# Patient Record
Sex: FEMALE | Race: WHITE | Employment: FULL TIME | URBAN - METROPOLITAN AREA
[De-identification: names, ages, dates, MRNs, and addresses within clinical notes are randomized per-mention and may not be internally consistent; named-entity substitution may affect disease eponyms.]

---

## 2022-03-12 ENCOUNTER — HOSPITAL ENCOUNTER (OUTPATIENT)
Dept: MRI IMAGING | Age: 52
Discharge: HOME | End: 2022-03-12
Attending: PHYSICIAN ASSISTANT
Payer: COMMERCIAL

## 2022-03-12 DIAGNOSIS — R20.2 LEFT LEG PARESTHESIAS: ICD-10-CM

## 2022-03-12 DIAGNOSIS — M54.42 CHRONIC BILATERAL LOW BACK PAIN WITH LEFT-SIDED SCIATICA: ICD-10-CM

## 2022-03-12 DIAGNOSIS — G89.29 CHRONIC BILATERAL LOW BACK PAIN WITH LEFT-SIDED SCIATICA: ICD-10-CM

## 2022-03-12 PROCEDURE — 72148 MRI LUMBAR SPINE W/O DYE: CPT

## 2022-03-14 NOTE — PROGRESS NOTES
MRI demonstrates arthritis, disc herniation, and spinal stenosis.  Follow up with orthopedics tomorrow as scheduled

## 2022-03-30 ENCOUNTER — HOSPITAL ENCOUNTER (OUTPATIENT)
Dept: PHYSICAL THERAPY | Age: 52
Discharge: HOME | End: 2022-03-30
Attending: PHYSICIAN ASSISTANT
Payer: COMMERCIAL

## 2022-03-30 DIAGNOSIS — M43.16 SPONDYLOLISTHESIS OF LUMBAR REGION: ICD-10-CM

## 2022-03-30 DIAGNOSIS — M54.16 LUMBAR RADICULOPATHY: ICD-10-CM

## 2022-03-30 DIAGNOSIS — M47.816 FACET ARTHROPATHY, LUMBAR: ICD-10-CM

## 2022-03-30 DIAGNOSIS — M54.42 CHRONIC LOW BACK PAIN WITH LEFT-SIDED SCIATICA, UNSPECIFIED BACK PAIN LATERALITY: ICD-10-CM

## 2022-03-30 DIAGNOSIS — M48.062 LUMBAR STENOSIS WITH NEUROGENIC CLAUDICATION: ICD-10-CM

## 2022-03-30 DIAGNOSIS — M51.36 DDD (DEGENERATIVE DISC DISEASE), LUMBAR: ICD-10-CM

## 2022-03-30 DIAGNOSIS — G89.29 CHRONIC LOW BACK PAIN WITH LEFT-SIDED SCIATICA, UNSPECIFIED BACK PAIN LATERALITY: ICD-10-CM

## 2022-03-30 PROCEDURE — 97161 PT EVAL LOW COMPLEX 20 MIN: CPT

## 2022-03-30 PROCEDURE — 97110 THERAPEUTIC EXERCISES: CPT

## 2022-03-30 NOTE — THERAPY EVALUATION
Laney Cavazos  : 1970  Primary: [employer]tan [employer] [facility] [employer]*  Secondary:  [address] at [place] [address] [address] [facility], [address] [place] [address], [facility], [address] [place] [address]  [phone]   [phone]       OUTPATIENT PHYSICAL THERAPY:Initial Assessment 3/30/2022    ICD-10: Treatment Diagnosis: Chronic low back pain with left sided sciatica, unspecified back pain laterality [M54.4, G89.29]                Treatment Diagnosis 2: DDD, lumbar [M51.36]                 Treatment Diagnosis 3: Lumbar stenosis with neurogenic claudication [M48.062]                 Treatment Diagnosis 4: Spondylolisthesis of lumbar region [M43.16]                Treatment Diagnosis 5: Facet arthropathy, lumbar [M47.816]                 Treatment Diagnosis 6: Lumbar radiculopathy [M54.16]                 Treatment Diagnosis 7: other abnormalities of gait and mobility (R26.89)   Precautions: Hypertension  Allergies: Patient has no known allergies. TREATMENT PLAN:  Effective Dates: 3/30/2022 TO 2022 (60 days). Frequency/Duration: 1-2 times a week for 60 Day(s) MEDICAL/REFERRING DIAGNOSIS:  Chronic low back pain with left-sided sciatica, unspecified back pain laterality [M54.42, G89.29]  DDD (degenerative disc disease), lumbar [M51.36]  Lumbar stenosis with neurogenic claudication [M48.062]  Spondylolisthesis of lumbar region [M43.16]  Facet arthropathy, lumbar [M47.816]  Lumbar radiculopathy [M54.16]   DATE OF ONSET: Chronic LBP with exacerbation over last 6 months   REFERRING PHYSICIAN: EMORY Ravi MD Orders: Evaluate and Treat   Return MD Appointment: 2022      INITIAL ASSESSMENT:  Ms. Connie Sanchez is a 54 y.o. female presenting to physical therapy with complaints of chronic low back pain with an exacerbation beginning about six months ago. She reports back pain ongoing for years with insidious onset.  She reports no significant acute injuries or surgeries to her back but a gradual onset with exacerbations over the years. Imaging revealed \"levoscoliosis but there is also some listing involved as well. Sagittal view of the lumbar spine reveals advanced degenerative changes in the lower lumbar spine with facet arthropathy. There is a grade 1 anterior listhesis and slight kyphosis through the L4-5 segment. 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 She is now working in [employer] where she is seated most of the day as her back pain will not allow her to perform those duties. She reports she enjoys walking for exercise and is eager to return to [facility] or better as she now has to plan her day around how far she will have to walk before she takes a sitting break due to severe pain. She states the more activity she does the worse the pain gets until she can sit down. She also reports sleep is disturbed and she has to be careful how she gets up when transferring supine to sitting. She describes the pain as a constant pain in her low back with radiating symptoms all the way down both LEs; worse on the L than the R with sensation loss. She is eager to reduce pain and return to normal life with as little pain or modification as possible. Patient presents with increased pain, decreased strength, decreased ROM, decreased flexibility, impaired gait, impaired posture, impaired transfer ability, decreased activity tolerance, and overall impaired functional mobility. Patient is a good candidate for skilled physical therapy interventions to include manual therapy, therapeutic exercise, balance training, gait training, transfer training, postural re-education, body mechanics training, and pain modalities as needed. PROBLEM LIST (Impacting functional limitations):  1. Decreased Strength  2. Decreased ADL/Functional Activities  3.  Decreased Transfer Abilities  4. Decreased Ambulation Ability/Technique  5. Decreased Balance  6. Increased Pain  7. Decreased Activity Tolerance  8. Decreased Flexibility/Joint Mobility  9. Decreased [place] with Home Exercise Program INTERVENTIONS PLANNED: (Treatment may consist of any combination of the following)  1. Balance Exercise  2. Bed Mobility  3. Cold  4. Cryotherapy  5. Electrical Stimulation  6. Family Education  7. Gait Training  8. Heat  9. Home Exercise Program (HEP)  10. Manual Therapy  11. Neuromuscular Re-education/Strengthening  12. Range of Motion (ROM)  13. Therapeutic Activites  14. Therapeutic Exercise/Strengthening  15. Transfer Training     GOALS: (Goals have been discussed and agreed upon with patient.)  Short-Term Functional Goals: Time Frame: 3/30/2022 to 04/30/2022  1. Patient demonstrates independence with home exercise program without verbal cueing provided by therapist.   2. Patient will report no more than 5/10 low back pain at rest in order to demonstrate improved self pain control and tolerance. 3. Patient will be educated in and demonstrate posterior pelvic tilt without performing valsalva maneuver. 4. Patient will be educated in and demonstrate proper squat lift technique in order to reduce stress on spine, improve safety, and reduce risk of further injury. Discharge Goals: Time Frame: 3/30/2022 to 05/29/2022  1. Patient will return to walking for exercise at prior level without pain as a limiting factor. 2. Patient will demonstrate bilateral hip strength of 4+/5 or better to facilitate improved gait and reduce back pain. 3. Patient will perform sit to stand and sit to and from supine transfers without pain. 4. Patient will return to regular recreational activities like shopping without having to take sitting breaks due to back pain. 5. Patient will improve Modified Oswestry Scale score to 10/50 from 26/50. Outcome Measure:    Tool Used: Modified Oswestry Low Back Pain Questionnaire  Score:  Initial: 26/50  Most Recent: X/50 (Date: -- )   Interpretation of Score: Each section is scored on a 0-5 scale, 5 representing the greatest disability. The scores of each section are added together for a total score of 50. Medical Necessity:   · Patient is expected to demonstrate progress in strength, range of motion, balance, coordination and functional technique to improve safety during ADLs, prevent risk of further injury, reduce pain. .  · Skilled intervention continues to be required due to need for exercise progressions and modifications as indicated. .  Reason for Services/Other Comments:  · Patient continues to require skilled intervention due to need for plan of care modifications and exceptions based on patient presentation. .  Total Evaluation Duration: 25 minutes    Rehabilitation Potential For Stated Goals: Good  Regarding Laney Cavazos's therapy, I certify that the treatment plan above will be carried out by a therapist or under their direction. Thank you for this referral,  Jovita Langston, PT     Referring Physician Signature: EMORY Fischer _______________________________ Date _____________             PAIN/SUBJECTIVE:    Initial: Pain Intensity 1: 8  Pain Location 1: Back,Leg,Buttocks  Pain Orientation 1: Posterior,Lateral,Left,Right  Post Session:  8/10    HISTORY:    History of Injury/Illness (Reason for Referral):  Ms. Jen Gomez is a 54 y.o. female presenting to physical therapy with complaints of chronic low back pain with an exacerbation beginning about six months ago. She reports back pain ongoing for years with insidious onset. She reports no significant acute injuries or surgeries to her back but a gradual onset with exacerbations over the years. Imaging revealed \"levoscoliosis but there is also some listing involved as well. Sagittal view of the lumbar spine reveals advanced degenerative changes in the lower lumbar spine with facet arthropathy.   There is a grade 1 anterior listhesis and slight kyphosis through the L4-5 segment. 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 She is now working in [employer] where she is seated most of the day as her back pain will not allow her to perform those duties. She reports she enjoys walking for exercise and is eager to return to [employer] or better as she now has to plan her day around how far she will have to walk before she takes a sitting break due to severe pain. She states the more activity she does the worse the pain gets until she can sit down. She also reports sleep is disturbed and she has to be careful how she gets up when transferring supine to sitting. She describes the pain as a constant pain in her low back with radiating symptoms all the way down both LEs; worse on the L than the R with sensation loss. She is eager to reduce pain and return to normal life with as little pain or modification as possible. Patient presents with increased pain, decreased strength, decreased ROM, decreased flexibility, impaired gait, impaired posture, impaired transfer ability, decreased activity tolerance, and overall impaired functional mobility. Patient is a good candidate for skilled physical therapy interventions to include manual therapy, therapeutic exercise, balance training, gait training, transfer training, postural re-education, body mechanics training, and pain modalities as needed. Past Medical History/Comorbidities:   Ms. Lisa Moreira  has a past medical history of GERD (gastroesophageal reflux disease). Ms. Lisa Moreira  has a past surgical history that includes hx gyn; hx cholecystectomy; hx wisdom teeth extraction (Bilateral); and hx gastric bypass. Social History/Living Environment:    Patient lives alone in a home without steps.    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 She previously enjoyed walking as her main way of exercise before this exacerbation. Dominant Side:         RIGHT    Ambulatory/Rehab Services H2 Model Falls Risk Assessment    Risk Factors:       No Risk Factors Identified Ability to Rise from Chair:       (1)  Pushes up, successful in one attempt    Falls Prevention Plan:       No modifications necessary    Total: (5 or greater = High Risk): 1    ©2010 [facility] [employer]. All Rights Reserved. [facility] Patent #[id number]. Federal Law prohibits the replication, distribution or use without written permission from [facility]    Current Medications:        Current Outpatient Medications:     hydroCHLOROthiazide (HYDRODIURIL) 12.5 mg tablet, Take 1 Tablet by mouth daily. , Disp: 90 Tablet, Rfl: 0    nabumetone (RELAFEN) 500 mg tablet, Take 1 Tablet by mouth two (2) times daily as needed for Pain., Disp: 60 Tablet, Rfl: 0    omeprazole (PRILOSEC) 40 mg capsule, Take 40 mg by mouth daily. , Disp: , Rfl:     Date Last Reviewed:  3/30/2022    Number of Personal Factors/Comorbidities that affect the Plan of Care:   1-2: MODERATE COMPLEXITY    EXAMINATION:    Patient denies any LE paresthesia except what is noted. Patient denies any increase of symptoms with cough, sneeze or valsalva. Patient denies any saddle paresthesia or bowel/bladder deficits. Patient does report some increase in symptoms when performing bowel movements and some numbness near saddle area; but no loss of bowel or bladder control. Patient denies any new headaches changes in vision, dizziness, vertigo, nausea, drop attacks, black outs, tinnitus, dysphagia, dysarthria, LE symptoms or bowel/bladder dysfunction. Observation/Orthostatic Postural Assessment:          Leg length near equal in supine and long sitting after bridging. R lateral trunk lean in standing. Palpation:          Patient is non tender to palpation near lumbar paraspinals. ROM:    AROM (degrees)   Lumbar Flexion WNL radiating symptoms worse with repeated motion    Lumbar Extension Impaired; radiating symptoms less with repeated motion    Lumbar Right Sidebend WNL   Lumbar Left Sidebend WNL       Strength: Motion Tested Left   (*/5) Right  (*/5)   Hip Flexion 4- 4   Hip IR 4- 4   Hip ER 4- 4   Knee Flexion 4 4   Knee Extension 5 5   Ankle Dorsiflexion 5 5   Ankle Plantarflexion 4 4   Gross core strength: Impaired based on observation of transfers. Special Tests:          Neural tension tests: Passive straight leg raise (SLR) test is negative. Crossed SLR test is negative. Passive Accessory Motion:         Hip, knee, ankle ROM grossly WNL normal end feel. Neurological Screen:              Myotomes: Key muscle strength testing through bilateral LE is intact at time of examination. Dermatomes: Sensation to light touch for bilateral LE is intact at time of examination. Reflexes: Patellar (L3/ L4): 1+                 Achilles (S1/ S2): 0       Functional Mobility:   Patient ambulates without assistive device with significant R lateral trunk lean. Antalgic gait. Balance:         Patient able to maintain SLS bilaterally for 10 seconds. Impaired based on patient report, slowed gait speed during turning, patient states at times she finds herself seeking UE support on furniture when feeling unstable. Body Structures Involved:  1. Nerves  2. Bones  3. Joints  4. Muscles  5. Ligaments Body Functions Affected:  1. Sensory/Pain  2. Neuromusculoskeletal  3. Movement Related Activities and Participation Affected:  1. General Tasks and Demands  2. Mobility  3. Self Care  4. Domestic Life  5. Interpersonal Interactions and Relationships  6.  Community, Social and [place]    Number of elements (examined above) that affect the Plan of Care: 1-2: LOW COMPLEXITY    CLINICAL PRESENTATION: Presentation:   Stable and uncomplicated: LOW COMPLEXITY    CLINICAL DECISION MAKING:    Use of outcome tool(s) and clinical judgement create a POC that gives a: Clear prediction of patient's progress: LOW COMPLEXITY

## 2022-03-30 NOTE — PROGRESS NOTES
Laney Cavazos  : 1970  Primary: Tae Mayer [facility] [employer]*  Secondary:  [address] at [place] [address] [address] [facility], [address] [place] [address], [address]  [phone]   [phone]      OUTPATIENT PHYSICAL THERAPY: Daily Treatment Note 3/30/2022    ICD-10: Treatment Diagnosis: Chronic low back pain with left sided sciatica, unspecified back pain laterality [M54.4, G89.29]                Treatment Diagnosis 2: DDD, lumbar [M51.36]                 Treatment Diagnosis 3: Lumbar stenosis with neurogenic claudication [M48.062]                 Treatment Diagnosis 4: Spondylolisthesis of lumbar region [M43.16]                Treatment Diagnosis 5: Facet arthropathy, lumbar [M47.816]                 Treatment Diagnosis 6: Lumbar radiculopathy [M54.16]                 Treatment Diagnosis 7: other abnormalities of gait and mobility (R26.89)   Precautions: Hypertension  Allergies: Patient has no known allergies. TREATMENT PLAN:  Effective Dates: 3/30/2022 TO 2022 (60 days). Frequency/Duration: 1-2 times a week for 60 Day(s) MEDICAL/REFERRING DIAGNOSIS:  Chronic low back pain with left-sided sciatica, unspecified back pain laterality [M54.42, G89.29]  DDD (degenerative disc disease), lumbar [M51.36]  Lumbar stenosis with neurogenic claudication [M48.062]  Spondylolisthesis of lumbar region [M43.16]  Facet arthropathy, lumbar [M47.816]  Lumbar radiculopathy [M54.16]   DATE OF ONSET: Chronic LBP with exacerbation over last 6 months   REFERRING PHYSICIAN: EMORY Roach MD Orders: Evaluate and Treat   Return MD Appointment: 2022      Pre-treatment Symptoms/Complaints: Patient stating she is eager to get back to her normal life.     Pain: Initial: Pain Intensity 1: 8  Pain Location 1: Back,Leg,Buttocks  Pain Orientation 1: Posterior,Lateral,Left,Right  Post Session:  810   Medications Last Reviewed:  3/30/2022  Updated Objective Findings:  See evaluation note from today   TREATMENT:   THERAPEUTIC EXERCISE: (25 minutes):  Exercises per grid below to improve mobility, strength and coordination. Required mod visual, verbal, manual and tactile cues to promote proper body alignment, promote proper body posture, promote proper body mechanics and promote proper body breathing techniques. Progressed resistance, range, repetitions and complexity of movement as indicated. Date:  3/30/2022 Date:   Date:     Activity/Exercise Parameters Parameters Parameters   Posterior Pelvic Tilt  1 x 15 with cues hooklying     Clamshell (unilateral)  X 30 hooklying black band around knees unilateral bilaterally with TA      Hip Adduction  X 30 with posterior pelvic tilt 3 second holds      Piriformis Stretch  2 x 30 sec each      Dead Bug  X 20 alternating emphasis on maintaining posterior pelvic tilt and control                    Time spent with patient reviewing proper muscle recruitment and technique with exercises; patient education. MANUAL THERAPY: (0 minutes): Joint mobilization and Soft tissue mobilization was utilized and necessary because of the patient's restricted joint motion, painful spasm, loss of articular motion and restricted motion of soft tissue   None today     MODALITIES: (0 minutes):      None today     HEP: As above; handouts given to patient for all exercises. Treatment/Session Summary:    · Response to Treatment:  Patient demonstrating good understanding of plan of care, exam findings, HEP. · Baseline vitals (3/30/2022): BP: 149/96mmHg 84bpm 98% SpO2   · Communication/Consultation:  Education on exam findings, plan of care, rationale for therapy as a means to address symptoms. · Equipment provided today:  HEP handout; black theraband loop   · Recommendations/Intent for next treatment session: Next visit will focus on exercise progressions focusing on neutral spine strengthening and lumbopelvic control.      Total Treatment Billable Duration:  50 minutes: 25 evaluation, 25 therapeutic exercise   PT Patient Time In/Time Out  Time In: 1106  Time Out: [address] [facility] [place],

## 2022-04-07 ENCOUNTER — HOSPITAL ENCOUNTER (OUTPATIENT)
Dept: PHYSICAL THERAPY | Age: 52
Discharge: HOME | End: 2022-04-07
Attending: PHYSICIAN ASSISTANT
Payer: COMMERCIAL

## 2022-04-07 PROBLEM — E66.9 OBESITY (BMI 30-39.9): Status: ACTIVE | Noted: 2022-04-07

## 2022-04-07 PROBLEM — M48.062 SPINAL STENOSIS OF LUMBAR REGION WITH NEUROGENIC CLAUDICATION: Status: ACTIVE | Noted: 2022-04-07

## 2022-04-07 PROBLEM — M43.16 SPONDYLOLISTHESIS AT L4-L5 LEVEL: Status: ACTIVE | Noted: 2022-04-07

## 2022-04-07 NOTE — PROGRESS NOTES
Physical Therapy  Donovan Montes at [facility] 4/7/2022     Patient was placed on call list but appointment that worked with patient's schedule did not become available.      Danielle Caicedo PT, DPT

## 2022-04-08 ENCOUNTER — HOSPITAL ENCOUNTER (OUTPATIENT)
Dept: PHYSICAL THERAPY | Age: 52
Discharge: HOME | End: 2022-04-08
Attending: PHYSICIAN ASSISTANT
Payer: COMMERCIAL

## 2022-04-08 PROCEDURE — 97110 THERAPEUTIC EXERCISES: CPT

## 2022-04-08 PROCEDURE — 97140 MANUAL THERAPY 1/> REGIONS: CPT

## 2022-04-08 NOTE — PROGRESS NOTES
Laney Cavazos  : 1970  Primary: Luther Mayer [facility] [employer]*  Secondary:  [address] at [place] [id number] [facility], [address] [place] [address], [place], [address] [place] [address]  [phone]   [phone]      OUTPATIENT PHYSICAL THERAPY: Daily Treatment Note 2022    ICD-10: Treatment Diagnosis: Chronic low back pain with left sided sciatica, unspecified back pain laterality [M54.4, G89.29]                Treatment Diagnosis 2: DDD, lumbar [M51.36]                 Treatment Diagnosis 3: Lumbar stenosis with neurogenic claudication [M48.062]                 Treatment Diagnosis 4: Spondylolisthesis of lumbar region [M43.16]                Treatment Diagnosis 5: Facet arthropathy, lumbar [M47.816]                 Treatment Diagnosis 6: Lumbar radiculopathy [M54.16]                 Treatment Diagnosis 7: other abnormalities of gait and mobility (R26.89)   Precautions: Hypertension  Allergies: Patient has no known allergies. TREATMENT PLAN:  Effective Dates: 3/30/2022 TO 2022 (60 days). Frequency/Duration: 1-2 times a week for 60 Day(s) MEDICAL/REFERRING DIAGNOSIS:  Chronic low back pain with left-sided sciatica, unspecified back pain laterality [M54.42, G89.29]  DDD (degenerative disc disease), lumbar [M51.36]  Lumbar stenosis with neurogenic claudication [M48.062]  Spondylolisthesis of lumbar region [M43.16]  Facet arthropathy, lumbar [M47.816]  Lumbar radiculopathy [M54.16]   DATE OF ONSET: Chronic LBP with exacerbation over last 6 months   REFERRING PHYSICIAN: EMORY Rodriguez MD Orders: Evaluate and Treat   Return MD Appointment: 2022      Pre-treatment Symptoms/Complaints: Patient reported standing and walking cause increased pain. Pain: Initial: Pain Intensity 1: 4  Pain Location 1: Back  Pain Orientation 1: Lateral,Left,Posterior  Post Session: 4 /10   Medications Last Reviewed:  2022  Updated Objective Findings:  Pt. entered clinic with moderate antalgic gait. TREATMENT:   THERAPEUTIC EXERCISE: (30 minutes):  Exercises per grid below to improve mobility, strength and coordination. Required mod visual, verbal, manual and tactile cues to promote proper body alignment, promote proper body posture, promote proper body mechanics and promote proper body breathing techniques. Progressed resistance, range, repetitions and complexity of movement as indicated. Date:  3/30/2022 Date:  4/8/22 Date:     Activity/Exercise Parameters Parameters Parameters   Posterior Pelvic Tilt  1 x 15 with cues hooklying X 20 reps in hooklying 5 sec hold each     Clamshell (unilateral)  X 30 hooklying black band around knees unilateral bilaterally with TA  X 30 reps hooklying black band around both knee unilateral with TA    Hip Adduction  X 30 with posterior pelvic tilt 3 second holds  X 30 reps with posterior pelvic tilt 5 sec holds     Piriformis Stretch  2 x 30 sec each  4x30 sec hold each     Dead Bug  X 20 alternating emphasis on maintaining posterior pelvic tilt and control  X 20 reps alternating emphasis on maintaining posterior pelvic tilt and control    Hamstring stretch  4x30 sec hold BLE's with strap             Time spent with patient reviewing proper muscle recruitment and technique with exercises; patient education. MANUAL THERAPY: (25 minutes): Joint mobilization and Soft tissue mobilization was utilized and necessary because of the patient's restricted joint motion, painful spasm, loss of articular motion and restricted motion of soft tissue   In right sidelying patient received soft tissue mobilization to decrease pain and tightness in left low back and gluteals     MODALITIES: (0 minutes):      None today     HEP: As above; handouts given to patient for all exercises.     Treatment/Session Summary:    · Response to Treatment:  Patient demonstrated knowledge and felt confident with exercises  · Baseline vitals (3/30/2022): BP: 149/96mmHg 84bpm 98% SpO2 · Communication/Consultation:  Education on exam findings, plan of care, rationale for therapy as a means to address symptoms. · Equipment provided today:  HEP handout; black theraband loop   · Recommendations/Intent for next treatment session: Next visit will focus on exercise progressions focusing on neutral spine strengthening and lumbopelvic control.      Total Treatment Billable Duration:  55 mins   PT Patient Time In/Time Out  Time In: 1530  Time Out: [address] [place]

## 2022-04-11 PROBLEM — E66.9 OBESITY (BMI 30-39.9): Status: ACTIVE | Noted: 2022-04-07

## 2022-04-11 PROBLEM — M48.062 SPINAL STENOSIS OF LUMBAR REGION WITH NEUROGENIC CLAUDICATION: Status: ACTIVE | Noted: 2022-04-07

## 2022-04-11 PROBLEM — M43.16 SPONDYLOLISTHESIS AT L4-L5 LEVEL: Status: ACTIVE | Noted: 2022-04-07

## 2022-04-13 ENCOUNTER — HOSPITAL ENCOUNTER (OUTPATIENT)
Dept: PHYSICAL THERAPY | Age: 52
Discharge: HOME | End: 2022-04-13
Attending: PHYSICIAN ASSISTANT
Payer: COMMERCIAL

## 2022-04-13 PROCEDURE — 97110 THERAPEUTIC EXERCISES: CPT

## 2022-04-13 PROCEDURE — 97140 MANUAL THERAPY 1/> REGIONS: CPT

## 2022-04-13 NOTE — PROGRESS NOTES
Laney Jadechie  : 1970  Primary: [employer] [facility] [employer]*  Secondary:  [address] at [place] [id number] Shaheen Locke [facility], [address] [place] [address], [place], [address] [place] [address]  [phone]   [phone]      OUTPATIENT PHYSICAL THERAPY: Daily Treatment Note 2022    ICD-10: Treatment Diagnosis: Chronic low back pain with left sided sciatica, unspecified back pain laterality [M54.4, G89.29]                Treatment Diagnosis 2: DDD, lumbar [M51.36]                 Treatment Diagnosis 3: Lumbar stenosis with neurogenic claudication [M48.062]                 Treatment Diagnosis 4: Spondylolisthesis of lumbar region [M43.16]                Treatment Diagnosis 5: Facet arthropathy, lumbar [M47.816]                 Treatment Diagnosis 6: Lumbar radiculopathy [M54.16]                 Treatment Diagnosis 7: other abnormalities of gait and mobility (R26.89)   Precautions: Hypertension  Allergies: Patient has no known allergies. TREATMENT PLAN:  Effective Dates: 3/30/2022 TO 2022 (60 days). Frequency/Duration: 1-2 times a week for 60 Day(s) MEDICAL/REFERRING DIAGNOSIS:  Chronic low back pain with left-sided sciatica, unspecified back pain laterality [M54.42, G89.29]  DDD (degenerative disc disease), lumbar [M51.36]  Lumbar stenosis with neurogenic claudication [M48.062]  Spondylolisthesis of lumbar region [M43.16]  Facet arthropathy, lumbar [M47.816]  Lumbar radiculopathy [M54.16]   DATE OF ONSET: Chronic LBP with exacerbation over last 6 months   REFERRING PHYSICIAN: EMORY Ravi MD Orders: Evaluate and Treat   Return MD Appointment: 2022      Pre-treatment Symptoms/Complaints: Patient reports prolonged standing and walking continue to cause pain, such as going around the grocery store.     Pain: Initial: Pain Intensity 1: 3  Pain Location 1: Back  Pain Orientation 1: Lateral,Left,Posterior  Post Session: 4 /10   Medications Last Reviewed:  2022  Updated Objective Findings: Patient ambulating with flexion moment at trunk   TREATMENT:   THERAPEUTIC EXERCISE: (30 minutes):  Exercises per grid below to improve mobility, strength and coordination. Required mod visual, verbal, manual and tactile cues to promote proper body alignment, promote proper body posture, promote proper body mechanics and promote proper body breathing techniques. Progressed resistance, range, repetitions and complexity of movement as indicated. Date:  3/30/2022 Date:  4/8/22 Date:  4/13/22   Activity/Exercise Parameters Parameters Parameters   Posterior Pelvic Tilt  1 x 15 with cues hooklying X 20 reps in hooklying 5 sec hold each  x10 in hooklying cuing for technique    Clamshell (unilateral)  X 30 hooklying black band around knees unilateral bilaterally with TA  X 30 reps hooklying black band around both knee unilateral with TA 2x10 bilat hooklying with black band around both knee with TA activation   Hip Adduction  X 30 with posterior pelvic tilt 3 second holds  X 30 reps with posterior pelvic tilt 5 sec holds  X 30 reps with posterior pelvic tilt 5 sec holds   Piriformis Stretch  2 x 30 sec each  4x30 sec hold each  2 x 30 sec each    Dead Bug  X 20 alternating emphasis on maintaining posterior pelvic tilt and control  X 20 reps alternating emphasis on maintaining posterior pelvic tilt and control X 20 reps alternating emphasis on maintaining posterior pelvic tilt and control    Cuing for UE only with TA activation   Hamstring stretch  4x30 sec hold BLE's with strap  x20 bilat with strap holding 2-3 sec   Bridge   1x5 with posterior tilt stopped secondary to pain    3x5 straight leg glute set on roller   Leg Raise   2x10 sidelying hip abduction     Time spent with patient reviewing proper muscle recruitment and technique with exercises; patient education.      MANUAL THERAPY: (24 minutes): Joint mobilization and Soft tissue mobilization was utilized and necessary because of the patient's restricted joint motion, painful spasm, loss of articular motion and restricted motion of soft tissue   In right sidelying patient received soft tissue mobilization to decrease pain and tightness in left low back and gluteals     MODALITIES: (0 minutes):      None today     HEP: As above; handouts given to patient for all exercises. Treatment/Session Summary:    · Response to Treatment:  Added sidelying hip abduction and glute set per supine with leg over roller. Patient was a little sore after trying bilat hooklying bridge so exercise was stopped. Patient verablized understanding of HEP. · Baseline vitals (3/30/2022): BP: 149/96mmHg 84bpm 98% SpO2   · Communication/Consultation:  Education on exam findings, plan of care, rationale for therapy as a means to address symptoms. · Equipment provided today:  HEP handout; black theraband loop   · Recommendations/Intent for next treatment session: Next visit will focus on exercise progressions focusing on neutral spine strengthening and lumbopelvic control.      Total Treatment Billable Duration:  54 mins   PT Patient Time In/Time Out  Time In: 0700  Time Out: 0800  Marline Rowan PT

## 2022-04-15 ENCOUNTER — HOSPITAL ENCOUNTER (OUTPATIENT)
Dept: PHYSICAL THERAPY | Age: 52
Discharge: HOME | End: 2022-04-15
Attending: PHYSICIAN ASSISTANT
Payer: COMMERCIAL

## 2022-04-15 PROCEDURE — 97110 THERAPEUTIC EXERCISES: CPT

## 2022-04-15 PROCEDURE — 97140 MANUAL THERAPY 1/> REGIONS: CPT

## 2022-04-20 ENCOUNTER — HOSPITAL ENCOUNTER (OUTPATIENT)
Dept: PHYSICAL THERAPY | Age: 52
Discharge: HOME | End: 2022-04-20
Attending: PHYSICIAN ASSISTANT
Payer: COMMERCIAL

## 2022-04-20 PROCEDURE — 97140 MANUAL THERAPY 1/> REGIONS: CPT

## 2022-04-20 PROCEDURE — 97110 THERAPEUTIC EXERCISES: CPT

## 2022-04-20 NOTE — PROGRESS NOTES
Laney Cavazos  : 1970  Primary: Ann [employer] [facility] [employer]*  Secondary:  [address] at [place] [id number] Shaheen Locke [facility], [address] [place] [address], [place], [address] [place] [address]  [phone]   [phone]      OUTPATIENT PHYSICAL THERAPY: Daily Treatment Note 2022    ICD-10: Treatment Diagnosis: Chronic low back pain with left sided sciatica, unspecified back pain laterality [M54.4, G89.29]                Treatment Diagnosis 2: DDD, lumbar [M51.36]                 Treatment Diagnosis 3: Lumbar stenosis with neurogenic claudication [M48.062]                 Treatment Diagnosis 4: Spondylolisthesis of lumbar region [M43.16]                Treatment Diagnosis 5: Facet arthropathy, lumbar [M47.816]                 Treatment Diagnosis 6: Lumbar radiculopathy [M54.16]                 Treatment Diagnosis 7: other abnormalities of gait and mobility (R26.89)   Precautions: Hypertension  Allergies: Patient has no known allergies. TREATMENT PLAN:  Effective Dates: 3/30/2022 TO 2022 (60 days). Frequency/Duration: 1-2 times a week for 60 Day(s) MEDICAL/REFERRING DIAGNOSIS:  Chronic low back pain with left-sided sciatica, unspecified back pain laterality [M54.42, G89.29]  DDD (degenerative disc disease), lumbar [M51.36]  Lumbar stenosis with neurogenic claudication [M48.062]  Spondylolisthesis of lumbar region [M43.16]  Facet arthropathy, lumbar [M47.816]  Lumbar radiculopathy [M54.16]   DATE OF ONSET: Chronic LBP with exacerbation over last 6 months   REFERRING PHYSICIAN: EMORY Laguerre MD Orders: Evaluate and Treat   Return MD Appointment: 2022      Pre-treatment Symptoms/Complaints: Patient reports continued discomfort over past few days with little to no relief. Patient had to  line for 30 minutes and reported increased symptoms. Patient reports epidural may have worn off and she is getting increased discomfort from all exercises on her L side.      Pain: Initial: Pain Intensity 1: 5  Pain Location 1: Back  Pain Orientation 1: Lateral,Left,Posterior  Post Session: 5/10   Medications Last Reviewed:  4/20/2022  Updated Objective Findings:  Patient reports increased pain referral down L leg today with exercise. TREATMENT:   THERAPEUTIC EXERCISE: (30 minutes):  Exercises per grid below to improve mobility, strength and coordination. Required mod visual, verbal, manual and tactile cues to promote proper body alignment, promote proper body posture, promote proper body mechanics and promote proper body breathing techniques. Progressed resistance, range, repetitions and complexity of movement as indicated.      Date:  04/15/22 Date:  4/20/22 Date:  4/13/22   Activity/Exercise Parameters Parameters Parameters   Posterior Pelvic Tilt  1 x 15 with cues hooklying 1 x 15 with cues hooklying x10 in hooklying cuing for technique    Clamshell (unilateral)  2 x 10 hooklying black band around knees unilateral bilaterally with TA  2 x 10 hooklying black band around knees unilateral bilaterally with TA  2x10 bilat hooklying with black band around both knee with TA activation   Hip Adduction  X 20 with posterior pelvic tilt and TA 5 sec holds X 20 with posterior pelvic tilt and TA 5 sec holds  X 30 reps with posterior pelvic tilt 5 sec holds   Piriformis Stretch  HEP HEP  2 x 30 sec each    Dead Bug  Attempted to modify but patient reporting increase in pain so HOLD Hold X 20 reps alternating emphasis on maintaining posterior pelvic tilt and control    Cuing for UE only with TA activation   Hamstring stretch HEP HEP x20 bilat with strap holding 2-3 sec   Bridge --- HOLD Hold 1x5 with posterior tilt stopped secondary to pain    3x5 straight leg glute set on roller   Hip Abduction  2 x 10 sidelying cone 2 x 10 sidelying cone 2x10 sidelying hip abduction   Clamshell  2 x 10 sidelying 2 x 10 sidelying    [employer] [employer] Up --- HOLD due to patient report of pain  Hold    Palloff Press  2 x 10 blue band seated  2 x 10 blue band seated      Time spent with patient reviewing proper muscle recruitment and technique with exercises; patient education. MANUAL THERAPY: (26 minutes): Joint mobilization and Soft tissue mobilization was utilized and necessary because of the patient's restricted joint motion, painful spasm, loss of articular motion and restricted motion of soft tissue   In right sidelying patient received soft tissue mobilization to decrease pain and tightness in left low back and gluteals     MODALITIES: (0 minutes):      None today     HEP: As above; handouts given to patient for all exercises. Treatment/Session Summary:    · Response to Treatment:  Patient continues to have intermittent discomfort down L posterior LE with exercise. Cued to perform and control ROM to stay within pain free range. · Baseline vitals (3/30/2022): BP: 149/96mmHg 84bpm 98% SpO2   · Communication/Consultation:  Education on exercise progressions. · Equipment provided today:  HEP handout new   · Recommendations/Intent for next treatment session: Next visit will focus on exercise progressions focusing on neutral spine strengthening and lumbopelvic control.      Total Treatment Billable Duration:  56 mins   PT Patient Time In/Time Out  Time In: 0702  Time Out: 0800  Belen Buerger, PT

## 2022-04-22 ENCOUNTER — HOSPITAL ENCOUNTER (OUTPATIENT)
Dept: PHYSICAL THERAPY | Age: 52
Discharge: HOME | End: 2022-04-22
Attending: PHYSICIAN ASSISTANT
Payer: COMMERCIAL

## 2022-04-22 PROCEDURE — 97110 THERAPEUTIC EXERCISES: CPT

## 2022-04-22 PROCEDURE — 97140 MANUAL THERAPY 1/> REGIONS: CPT

## 2022-04-22 NOTE — PROGRESS NOTES
Laney Cavazos  : 1970  Primary: Philippe Mayer [facility] [employer]*  Secondary:  [address] at [place] [id number] [address] Shaheen Locke [facility], [address] [place] [address], [address]  [phone]   [phone]      OUTPATIENT PHYSICAL THERAPY: Daily Treatment Note 2022    ICD-10: Treatment Diagnosis: Chronic low back pain with left sided sciatica, unspecified back pain laterality [M54.4, G89.29]                Treatment Diagnosis 2: DDD, lumbar [M51.36]                 Treatment Diagnosis 3: Lumbar stenosis with neurogenic claudication [M48.062]                 Treatment Diagnosis 4: Spondylolisthesis of lumbar region [M43.16]                Treatment Diagnosis 5: Facet arthropathy, lumbar [M47.816]                 Treatment Diagnosis 6: Lumbar radiculopathy [M54.16]                 Treatment Diagnosis 7: other abnormalities of gait and mobility (R26.89)   Precautions: Hypertension  Allergies: Patient has no known allergies. TREATMENT PLAN:  Effective Dates: 3/30/2022 TO 2022 (60 days). Frequency/Duration: 1-2 times a week for 60 Day(s) MEDICAL/REFERRING DIAGNOSIS:  Chronic low back pain with left-sided sciatica, unspecified back pain laterality [M54.42, G89.29]  DDD (degenerative disc disease), lumbar [M51.36]  Lumbar stenosis with neurogenic claudication [M48.062]  Spondylolisthesis of lumbar region [M43.16]  Facet arthropathy, lumbar [M47.816]  Lumbar radiculopathy [M54.16]   DATE OF ONSET: Chronic LBP with exacerbation over last 6 months   REFERRING PHYSICIAN: EMORY Fischer MD Orders: Evaluate and Treat   Return MD Appointment: 2022      Pre-treatment Symptoms/Complaints: Patient reports continued discomfort in her L lower extremity and spine; stating it has been this way since last week when she thinks injection may have started wearing off.      Pain: Initial: Pain Intensity 1: 6  Pain Location 1: Back  Pain Orientation 1: Lateral,Left,Posterior  Post Session: 6/10 Medications Last Reviewed:  4/22/2022  Updated Objective Findings: Improved with technique and control during exercises. TREATMENT:   THERAPEUTIC EXERCISE: (23 minutes):  Exercises per grid below to improve mobility, strength and coordination. Required mod visual, verbal, manual and tactile cues to promote proper body alignment, promote proper body posture, promote proper body mechanics and promote proper body breathing techniques. Progressed resistance, range, repetitions and complexity of movement as indicated. Date:  04/22/22 Date:  4/20/22 Date:  4/13/22   Activity/Exercise Parameters Parameters Parameters   Posterior Pelvic Tilt  1 x 15 with cues hooklying 1 x 15 with cues hooklying x10 in hooklying cuing for technique    Clamshell (unilateral)  2 x 10 hooklying black band around knees unilateral bilaterally with TA  2 x 10 hooklying black band around knees unilateral bilaterally with TA  2x10 bilat hooklying with black band around both knee with TA activation   Hip Adduction  X 20 with pillow TA 5 sec holds X 20 with posterior pelvic tilt and TA 5 sec holds  X 30 reps with posterior pelvic tilt 5 sec holds   Piriformis Stretch  2 x 30 sec HEP  2 x 30 sec each    Dead Bug  --- HOLD Hold X 20 reps alternating emphasis on maintaining posterior pelvic tilt and control    Cuing for UE only with TA activation   Hamstring stretch ---  HEP x20 bilat with strap holding 2-3 sec   Bridge --- HOLD Hold 1x5 with posterior tilt stopped secondary to pain    3x5 straight leg glute set on roller   Hip Abduction  --- HOLD 2 x 10 sidelying cone 2x10 sidelying hip abduction   Clamshell  2 x 10 sidelying 2 x 10 sidelying    [employer] [employer] Up --- HOLD  Hold    [employer] Press  2 x 10 blue band seated progressed to one leg extended  2 x 10 blue band seated    SKTC 4 x 30 sec holds       Time spent with patient reviewing proper muscle recruitment and technique with exercises; patient education.      MANUAL THERAPY: (30 minutes): Joint mobilization and Soft tissue mobilization was utilized and necessary because of the patient's restricted joint motion, painful spasm, loss of articular motion and restricted motion of soft tissue   In right and left sidelying patient received soft tissue mobilization to decrease pain and tightness in bilateral low back and gluteals     MODALITIES: (0 minutes):      None today     HEP: As above; handouts given to patient for all exercises. Treatment/Session Summary:    · Response to Treatment:  Tolerated modified session well; avoiding exercises that exacerbated pain . · Baseline vitals (3/30/2022): BP: 149/96mmHg 84bpm 98% SpO2   · Communication/Consultation:  Education on exercise progressions. · Equipment provided today:  None today  · Recommendations/Intent for next treatment session: Next visit will focus on exercise progressions focusing on neutral spine strengthening and lumbopelvic control.      Total Treatment Billable Duration:  53 mins   PT Patient Time In/Time Out  Time In: 1332  Time Out: [address] Frank Martínez, PT

## 2022-04-26 PROBLEM — I10 ESSENTIAL HYPERTENSION: Status: ACTIVE | Noted: 2022-04-26

## 2022-04-27 ENCOUNTER — HOSPITAL ENCOUNTER (OUTPATIENT)
Dept: PHYSICAL THERAPY | Age: 52
Discharge: HOME | End: 2022-04-27
Attending: PHYSICIAN ASSISTANT
Payer: COMMERCIAL

## 2022-04-27 PROCEDURE — 97110 THERAPEUTIC EXERCISES: CPT

## 2022-04-27 PROCEDURE — 97140 MANUAL THERAPY 1/> REGIONS: CPT

## 2022-04-27 NOTE — PROGRESS NOTES
Laney Cavazos  : 1970  Primary: Cheko [employer] [facility] [facility] [employer]*  Secondary:  [address] at [place] [id number] [address] Shaheen Locke [facility], [address] [place] [address], [address]  [phone]   [phone]      OUTPATIENT PHYSICAL THERAPY: Daily Treatment Note 2022    ICD-10: Treatment Diagnosis: Chronic low back pain with left sided sciatica, unspecified back pain laterality [M54.4, G89.29]                Treatment Diagnosis 2: DDD, lumbar [M51.36]                 Treatment Diagnosis 3: Lumbar stenosis with neurogenic claudication [M48.062]                 Treatment Diagnosis 4: Spondylolisthesis of lumbar region [M43.16]                Treatment Diagnosis 5: Facet arthropathy, lumbar [M47.816]                 Treatment Diagnosis 6: Lumbar radiculopathy [M54.16]                 Treatment Diagnosis 7: other abnormalities of gait and mobility (R26.89)   Precautions: Hypertension  Allergies: Patient has no known allergies. TREATMENT PLAN:  Effective Dates: 3/30/2022 TO 2022 (60 days). Frequency/Duration: 1-2 times a week for 60 Day(s) MEDICAL/REFERRING DIAGNOSIS:  Chronic low back pain with left-sided sciatica, unspecified back pain laterality [M54.42, G89.29]  DDD (degenerative disc disease), lumbar [M51.36]  Lumbar stenosis with neurogenic claudication [M48.062]  Spondylolisthesis of lumbar region [M43.16]  Facet arthropathy, lumbar [M47.816]  Lumbar radiculopathy [M54.16]   DATE OF ONSET: Chronic LBP with exacerbation over last 6 months   REFERRING PHYSICIAN: EMORY Omalley MD Orders: Evaluate and Treat   Return MD Appointment: 2022      Pre-treatment Symptoms/Complaints: Patient reports continued discomfort. Patient is feeling a little discouraged and follow up with MD on 22.     Pain: Initial: Pain Intensity 1: 5  Pain Location 1: Back  Pain Orientation 1: Lateral,Left,Posterior  Post Session: 10   Medications Last Reviewed:  2022  Updated Objective Findings: Patient ambulates with decreased hip extension on L side   TREATMENT:   THERAPEUTIC EXERCISE: (25 minutes):  Exercises per grid below to improve mobility, strength and coordination. Required mod visual, verbal, manual and tactile cues to promote proper body alignment, promote proper body posture, promote proper body mechanics and promote proper body breathing techniques. Progressed resistance, range, repetitions and complexity of movement as indicated. Date:  04/22/22 Date:  4/20/22 Date:  4/27/22   Activity/Exercise Parameters Parameters Parameters   Nu Step   5 min level 2.0   Posterior Pelvic Tilt  1 x 15 with cues hooklying 1 x 15 with cues hooklying 1 x 15 with cues hooklying   Clamshell (unilateral)  2 x 10 hooklying black band around knees unilateral bilaterally with TA  2 x 10 hooklying black band around knees unilateral bilaterally with TA  2 x 10 hooklying black band around knees unilateral bilaterally with TA    Hip Adduction  X 20 with pillow TA 5 sec holds X 20 with posterior pelvic tilt and TA 5 sec holds  X 20 with pillow TA 5 sec holds   Piriformis Stretch  2 x 30 sec HEP  2 x 30 sec each    Hamstring stretch ---  HEP HEP   Hip Abduction  --- HOLD 2 x 10 sidelying cone 2x10 sidelying hip abduction   Clamshell  2 x 10 sidelying 2 x 10 sidelying 2 x 10 sidelying   [employer] Press  2 x 10 blue band seated progressed to one leg extended  2 x 10 blue band seated 2 x 10 blue band seated progressed to one leg extended    Half kneeling pelvic tilt on airrex   1x Stopped secondary to pain    SKTC 4 x 30 sec holds  Next session     Time spent with patient reviewing proper muscle recruitment and technique with exercises; patient education.      MANUAL THERAPY: (30 minutes): Joint mobilization and Soft tissue mobilization was utilized and necessary because of the patient's restricted joint motion, painful spasm, loss of articular motion and restricted motion of soft tissue   In right and left sidelying patient received soft tissue mobilization to decrease pain and tightness in bilateral low back and gluteals     MODALITIES: (0 minutes):      None today     HEP: As above; handouts given to patient for all exercises. Treatment/Session Summary:    · Response to Treatment:  Attempted to work on pelvic tilt in half kneeling with UE support but patient had increased pain in L hip that persisted throughout session. Consider attempting PROM hip extension and staggered stance stability exercise next session. Reassess irritability. Patient did get a little relief from nu step. · Baseline vitals (3/30/2022): BP: 149/96mmHg 84bpm 98% SpO2   · Communication/Consultation:  Education on exercise progressions. · Equipment provided today:  None today  · Recommendations/Intent for next treatment session: Next visit will focus on exercise progressions focusing on neutral spine strengthening and lumbopelvic control.      Total Treatment Billable Duration:  55 mins   PT Patient Time In/Time Out  Time In: 0700  Time Out: 0800  Lula Desai, PT

## 2022-04-29 ENCOUNTER — HOSPITAL ENCOUNTER (OUTPATIENT)
Dept: PHYSICAL THERAPY | Age: 52
Discharge: HOME | End: 2022-04-29
Attending: PHYSICIAN ASSISTANT
Payer: COMMERCIAL

## 2022-04-29 PROCEDURE — 97140 MANUAL THERAPY 1/> REGIONS: CPT

## 2022-04-29 PROCEDURE — 97110 THERAPEUTIC EXERCISES: CPT

## 2022-04-29 NOTE — PROGRESS NOTES
Laney Cavazos  : 1970  Primary: [employer] [facility] [employer]*  Secondary:  [address] at [place] [address] [address] Sha[address] [facility], [address] [place] [address], [address]  [phone]   [phone]       OUTPATIENT PHYSICAL THERAPY:Progress Report 2022    ICD-10: Treatment Diagnosis: Chronic low back pain with left sided sciatica, unspecified back pain laterality [M54.4, G89.29]                Treatment Diagnosis 2: DDD, lumbar [M51.36]                 Treatment Diagnosis 3: Lumbar stenosis with neurogenic claudication [M48.062]                 Treatment Diagnosis 4: Spondylolisthesis of lumbar region [M43.16]                Treatment Diagnosis 5: Facet arthropathy, lumbar [M47.816]                 Treatment Diagnosis 6: Lumbar radiculopathy [M54.16]                 Treatment Diagnosis 7: other abnormalities of gait and mobility (R26.89)   Precautions: Hypertension  Allergies: Patient has no known allergies. TREATMENT PLAN:  Effective Dates: 3/30/2022 TO 2022 (60 days). Frequency/Duration: 1-2 times a week for 60 Day(s) MEDICAL/REFERRING DIAGNOSIS:  Lumbago with sciatica, left side [M54.42]  Other intervertebral disc degeneration, lumbar region [M51.36]  Spondylolisthesis, lumbar region [M43.16]  Spondylosis without myelopathy or radiculopathy, lumbar region [M47.816]  Radiculopathy, lumbar region [M54.16]   DATE OF ONSET: Chronic LBP with exacerbation over last 6 months   REFERRING PHYSICIAN: EMORY Regalado MD Orders: Evaluate and Treat   Return MD Appointment: 2022      INITIAL ASSESSMENT:  Ms. Cameron Ceja is a 54 y.o. female presenting to physical therapy with complaints of chronic low back pain with an exacerbation beginning about six months ago. She reports back pain ongoing for years with insidious onset. She reports no significant acute injuries or surgeries to her back but a gradual onset with exacerbations over the years.  Imaging revealed \"levoscoliosis but there is also some listing involved as well. Sagittal view of the lumbar spine reveals advanced degenerative changes in the lower lumbar spine with facet arthropathy. There is a grade 1 anterior listhesis and slight kyphosis through the L4-5 segment. 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 She is now working in [employer] where she is seated most of the day as her back pain will not allow her to perform those duties. She reports she enjoys walking for exercise and is eager to return to PLOF or better as she now has to plan her day around how far she will have to walk before she takes a sitting break due to severe pain. She states the more activity she does the worse the pain gets until she can sit down. She also reports sleep is disturbed and she has to be careful how she gets up when transferring supine to sitting. She describes the pain as a constant pain in her low back with radiating symptoms all the way down both LEs; worse on the L than the R with sensation loss. She is eager to reduce pain and return to normal life with as little pain or modification as possible. Patient presents with increased pain, decreased strength, decreased ROM, decreased flexibility, impaired gait, impaired posture, impaired transfer ability, decreased activity tolerance, and overall impaired functional mobility. Patient is a good candidate for skilled physical therapy interventions to include manual therapy, therapeutic exercise, balance training, gait training, transfer training, postural re-education, body mechanics training, and pain modalities as needed. PROGRESS REPORT 04/29/22: Ms. Tamra Iverson has been seen for 7 sessions of PT with limited success.  She has not experienced any improvement in pain and is planning to proceed with surgery at this point after meeting with surgeon and discussing options. She does demonstrate improved lumbopelvic control with pelvic tilt and would benefit from a few more sessions to practice home exercise program for hip and core strengthening with neutral spine to obtain as much core strength as possible prior to surgery. PROBLEM LIST (Impacting functional limitations):  1. Decreased Strength  2. Decreased ADL/Functional Activities  3. Decreased Transfer Abilities  4. Decreased Ambulation Ability/Technique  5. Decreased Balance  6. Increased Pain  7. Decreased Activity Tolerance  8. Decreased Flexibility/Joint Mobility  9. Decreased Miller with Home Exercise Program INTERVENTIONS PLANNED: (Treatment may consist of any combination of the following)  1. Balance Exercise  2. Bed Mobility  3. Cold  4. Cryotherapy  5. Electrical Stimulation  6. Family Education  7. Gait Training  8. Heat  9. Home Exercise Program (HEP)  10. Manual Therapy  11. Neuromuscular Re-education/Strengthening  12. Range of Motion (ROM)  13. Therapeutic Activites  14. Therapeutic Exercise/Strengthening  15. Transfer Training     GOALS: (Goals have been discussed and agreed upon with patient.)  Short-Term Functional Goals: Time Frame: 3/30/2022 to 04/30/2022  1. Patient demonstrates independence with home exercise program without verbal cueing provided by therapist. (GOAL MET)   2. Patient will report no more than 5/10 low back pain at rest in order to demonstrate improved self pain control and tolerance. (ONGOING)   3. Patient will be educated in and demonstrate posterior pelvic tilt without performing valsalva maneuver. (GOAL MET)  4. Patient will be educated in and demonstrate proper squat lift technique in order to reduce stress on spine, improve safety, and reduce risk of further injury. (ONGOING)  Discharge Goals: Time Frame: 3/30/2022 to 05/29/2022  1. Patient will return to walking for exercise at prior level without pain as a limiting factor. (ONGOING)  2. Patient will demonstrate bilateral hip strength of 4+/5 or better to facilitate improved gait and reduce back pain. (ONGOING)  3. Patient will perform sit to stand and sit to and from supine transfers without pain. (ONGOING)  4. Patient will return to regular recreational activities like shopping without having to take sitting breaks due to back pain. (ONGOING)  5. Patient will improve Modified Oswestry Scale score to 10/50 from 26/50. (ONGOING)    Outcome Measure: Tool Used: Modified Oswestry Low Back Pain Questionnaire  Score:  Initial: 26/50  Most Recent: /50 (Date: --- )   Interpretation of Score: Each section is scored on a 0-5 scale, 5 representing the greatest disability. The scores of each section are added together for a total score of 50. Patient denies any LE paresthesia except what is noted. Patient denies any increase of symptoms with cough, sneeze or valsalva. Patient denies any saddle paresthesia or bowel/bladder deficits. Patient does report some increase in symptoms when performing bowel movements and some numbness near saddle area; but no loss of bowel or bladder control. Patient denies any new headaches changes in vision, dizziness, vertigo, nausea, drop attacks, black outs, tinnitus, dysphagia, dysarthria, LE symptoms or bowel/bladder dysfunction. Observation/Orthostatic Postural Assessment:          Leg length near equal in supine and long sitting after bridging. R lateral trunk lean in standing. Palpation:          Patient is non tender to palpation near lumbar paraspinals. ROM:    AROM (degrees)   Lumbar Flexion WNL radiating symptoms worse with repeated motion    Lumbar Extension Impaired; radiating symptoms less with repeated motion    Lumbar Right Sidebend WNL   Lumbar Left Sidebend WNL       Strength:     Motion Tested Left   (*/5) Right  (*/5)   Hip Flexion 4- 4   Hip IR 4- 4   Hip ER 4- 4   Knee Flexion 4 4   Knee Extension 5 5   Ankle Dorsiflexion 5 5 Ankle Plantarflexion 4 4   Gross core strength: Impaired based on observation of transfers. Special Tests:          Neural tension tests: Passive straight leg raise (SLR) test is negative. Crossed SLR test is negative. Passive Accessory Motion:         Hip, knee, ankle ROM grossly WNL normal end feel. Neurological Screen:              Myotomes: Key muscle strength testing through bilateral LE is intact at time of examination. Dermatomes: Sensation to light touch for bilateral LE is intact at time of examination. Reflexes: Patellar (L3/ L4): 1+                 Achilles (S1/ S2): 0       Functional Mobility:   Patient ambulates without assistive device with significant R lateral trunk lean. Antalgic gait. Balance:         Patient able to maintain SLS bilaterally for 10 seconds. Impaired based on patient report, slowed gait speed during turning, patient states at times she finds herself seeking UE support on furniture when feeling unstable. Medical Necessity:   · Patient is expected to demonstrate progress in strength, range of motion, balance, coordination and functional technique to improve safety during ADLs, prevent risk of further injury, reduce pain. .  · Skilled intervention continues to be required due to need for exercise progressions and modifications as indicated. .  Reason for Services/Other Comments:  · Patient continues to require skilled intervention due to need for plan of care modifications and exceptions based on patient presentation. .    Rehabilitation Potential For Stated Goals: Good  Regarding Laney Cavazos's therapy, I certify that the treatment plan above will be carried out by a therapist or under their direction. Thank you for this referral,  Martina Khanna PT     Referring Physician Signature: EMORY Dumont No Signature is Required for this note.

## 2022-04-29 NOTE — PROGRESS NOTES
Laney Cavazos  : 1970  Primary: Kathleen Mathis [facility] [employer]*  Secondary:  [address] at [place] [id number] [address] Locke [facility], [address] [place] [address], [place], [address] [place] [address]  [phone]   [phone]      OUTPATIENT PHYSICAL THERAPY: Daily Treatment Note 2022    ICD-10: Treatment Diagnosis: Chronic low back pain with left sided sciatica, unspecified back pain laterality [M54.4, G89.29]                Treatment Diagnosis 2: DDD, lumbar [M51.36]                 Treatment Diagnosis 3: Lumbar stenosis with neurogenic claudication [M48.062]                 Treatment Diagnosis 4: Spondylolisthesis of lumbar region [M43.16]                Treatment Diagnosis 5: Facet arthropathy, lumbar [M47.816]                 Treatment Diagnosis 6: Lumbar radiculopathy [M54.16]                 Treatment Diagnosis 7: other abnormalities of gait and mobility (R26.89)   Precautions: Hypertension  Allergies: Patient has no known allergies. TREATMENT PLAN:  Effective Dates: 3/30/2022 TO 2022 (60 days). Frequency/Duration: 1-2 times a week for 60 Day(s) MEDICAL/REFERRING DIAGNOSIS:  Chronic low back pain with left-sided sciatica, unspecified back pain laterality [M54.42, G89.29]  DDD (degenerative disc disease), lumbar [M51.36]  Lumbar stenosis with neurogenic claudication [M48.062]  Spondylolisthesis of lumbar region [M43.16]  Facet arthropathy, lumbar [M47.816]  Lumbar radiculopathy [M54.16]   DATE OF ONSET: Chronic LBP with exacerbation over last 6 months   REFERRING PHYSICIAN: EMORY Ramirez MD Orders: Evaluate and Treat   Return MD Appointment: 2022      Pre-treatment Symptoms/Complaints: Patient reports feeling about the same overall but more symptoms in R leg recently. Stating she is planning to have surgery at this point.      Pain: Initial: Pain Intensity 1: 5  Pain Location 1: Back  Pain Orientation 1: Lateral,Left,Posterior/10  Post Session: 5/10   Medications Last Reviewed: 4/29/2022  Updated Objective Findings: Patient ambulates with decreased hip extension on L side. R leg was feeling numb    TREATMENT:   THERAPEUTIC EXERCISE: (23 minutes):  Exercises per grid below to improve mobility, strength and coordination. Required mod visual, verbal, manual and tactile cues to promote proper body alignment, promote proper body posture, promote proper body mechanics and promote proper body breathing techniques. Progressed resistance, range, repetitions and complexity of movement as indicated. Date:  04/22/22 Date:  4/29/22 Date:  4/27/22   Activity/Exercise Parameters Parameters Parameters   Nu Step --- 10 minutes level 4  5 min level 2.0   Posterior Pelvic Tilt  1 x 15 with cues hooklying HEP 1 x 15 with cues hooklying   Clamshell (unilateral)  2 x 10 hooklying black band around knees unilateral bilaterally with TA  HEP  2 x 10 hooklying black band around knees unilateral bilaterally with TA    Hip Adduction  X 20 with pillow TA 5 sec holds HEP X 20 with pillow TA 5 sec holds   Piriformis Stretch  2 x 30 sec 2 x 30 sec  2 x 30 sec each    Hamstring stretch ---  --- HEP   Hip Abduction  --- HOLD --- 2x10 sidelying hip abduction   Clamshell  2 x 10 sidelying 2 x 10 sidelying 2 x 10 sidelying   Palloff Press  2 x 10 blue band seated progressed to one leg extended  1 x 15 blue band seated  2 x 10 blue band seated progressed to one leg extended    Half kneeling pelvic tilt on airrex  --- HOLD 1x Stopped secondary to pain    SKTC 4 x 30 sec holds 3 x 30 sec  Next session   Latt Pulldown  3 x 10 blue alternating seated    Row  3 x 10 black alternating seated      Time spent with patient reviewing proper muscle recruitment and technique with exercises; patient education.      MANUAL THERAPY: (30 minutes): Joint mobilization and Soft tissue mobilization was utilized and necessary because of the patient's restricted joint motion, painful spasm, loss of articular motion and restricted motion of soft tissue   In right and left sidelying patient received soft tissue mobilization to decrease pain and tightness in bilateral low back and gluteals     MODALITIES: (0 minutes):      None today     HEP: As above; handouts given to patient for all exercises. Treatment/Session Summary:    · Response to Treatment:  Tolerated modified session fairly well with exercises in unsupported sitting. · Baseline vitals (3/30/2022): BP: 149/96mmHg 84bpm 98% SpO2   · Communication/Consultation:  Education on exercise progressions. · Equipment provided today:  None today  · Recommendations/Intent for next treatment session: Next visit will focus on exercise progressions focusing on neutral spine strengthening and lumbopelvic control.      Total Treatment Billable Duration:  53 mins   PT Patient Time In/Time Out  Time In: 1334  Time Out: [address], PT

## 2022-05-04 ENCOUNTER — HOSPITAL ENCOUNTER (OUTPATIENT)
Dept: PHYSICAL THERAPY | Age: 52
Discharge: HOME | End: 2022-05-04
Attending: PHYSICIAN ASSISTANT
Payer: COMMERCIAL

## 2022-05-06 ENCOUNTER — HOSPITAL ENCOUNTER (OUTPATIENT)
Dept: PHYSICAL THERAPY | Age: 52
Discharge: HOME | End: 2022-05-06
Attending: PHYSICIAN ASSISTANT
Payer: COMMERCIAL

## 2022-05-06 PROCEDURE — 97110 THERAPEUTIC EXERCISES: CPT

## 2022-05-06 NOTE — PROGRESS NOTES
Laney Cavazos  : 1970  Primary: Cecil Mayer [facility] [employer]*  Secondary:  [address] at [place] [address] [address] Sha[address]en Locke [facility], [address] [place] [address], [address]  [phone]   [phone]      OUTPATIENT PHYSICAL THERAPY: Daily Treatment Note 2022    ICD-10: Treatment Diagnosis: Chronic low back pain with left sided sciatica, unspecified back pain laterality [M54.4, G89.29]                Treatment Diagnosis 2: DDD, lumbar [M51.36]                 Treatment Diagnosis 3: Lumbar stenosis with neurogenic claudication [M48.062]                 Treatment Diagnosis 4: Spondylolisthesis of lumbar region [M43.16]                Treatment Diagnosis 5: Facet arthropathy, lumbar [M47.816]                 Treatment Diagnosis 6: Lumbar radiculopathy [M54.16]                 Treatment Diagnosis 7: other abnormalities of gait and mobility (R26.89)   Precautions: Hypertension  Allergies: Patient has no known allergies. TREATMENT PLAN:  Effective Dates: 3/30/2022 TO 2022 (60 days). Frequency/Duration: 1-2 times a week for 60 Day(s) MEDICAL/REFERRING DIAGNOSIS:  Chronic low back pain with left-sided sciatica, unspecified back pain laterality [M54.42, G89.29]  DDD (degenerative disc disease), lumbar [M51.36]  Lumbar stenosis with neurogenic claudication [M48.062]  Spondylolisthesis of lumbar region [M43.16]  Facet arthropathy, lumbar [M47.816]  Lumbar radiculopathy [M54.16]   DATE OF ONSET: Chronic LBP with exacerbation over last 6 months   REFERRING PHYSICIAN: EMORY Ha MD Orders: Evaluate and Treat   Return MD Appointment: 2022      Pre-treatment Symptoms/Complaints: Patient reports feeling about the same overall no changes in symptoms. Performing her HEP at home. She is planning to have surgery . Stating she feels ready to discharge to [facility] for now.      Pain: Initial: Pain Intensity 1: 5  Pain Location 1: Back  Pain Orientation 1: Lateral,Left,Posterior/10 Post Session: 5/10   Medications Last Reviewed:  5/6/2022  Updated Objective Findings: See discharge summary from today    TREATMENT:   THERAPEUTIC EXERCISE: (35 minutes):  Exercises per grid below to improve mobility, strength and coordination. Required mod visual, verbal, manual and tactile cues to promote proper body alignment, promote proper body posture, promote proper body mechanics and promote proper body breathing techniques. Progressed resistance, range, repetitions and complexity of movement as indicated. Date:  05/06/22 Date:  4/29/22 Date:  4/27/22   Activity/Exercise Parameters Parameters Parameters   Nu Step 10 minutes level 4  10 minutes level 4  5 min level 2.0   Posterior Pelvic Tilt  With other exercises  HEP 1 x 15 with cues hooklying   Clamshell (unilateral)  2 x 10 hooklying black band around knees unilateral bilateral HEP  2 x 10 hooklying black band around knees unilateral bilaterally with TA    Hip Adduction  X 1 minute TA 5 sec holds with yellow ball  HEP X 20 with pillow TA 5 sec holds   Piriformis Stretch  2 x 30 sec 2 x 30 sec  2 x 30 sec each    Hamstring stretch --- --- HEP   Hip Abduction  --- HOLD --- 2x10 sidelying hip abduction   Clamshell  2 x 15 sidelying 2 x 10 sidelying 2 x 10 sidelying   Palloff Press  1 x 10 blue band seated  1 x 15 blue band seated  2 x 10 blue band seated progressed to one leg extended    Half kneeling pelvic tilt on airrex --- HOLD --- HOLD 1x Stopped secondary to pain    SKTC 4 x 30 sec holds 3 x 30 sec  Next session   Latt Pulldown X 20 blue band seated  3 x 10 blue alternating seated    Row X 20 black band seated  3 x 10 black alternating seated      Time spent with patient reviewing proper muscle recruitment and technique with exercises; patient education.      MANUAL THERAPY: (0 minutes patient declined): Joint mobilization and Soft tissue mobilization was utilized and necessary because of the patient's restricted joint motion, painful spasm, loss of articular motion and restricted motion of soft tissue   In right and left sidelying patient received soft tissue mobilization to decrease pain and tightness in bilateral low back and gluteals     MODALITIES: (0 minutes):      None today     HEP: As above; handouts given to patient for all exercises. Treatment/Session Summary:    · Response to Treatment:  Tolerated modified session well; good understanding of HEP, precautions and activities to avoid. · Baseline vitals (3/30/2022): BP: 149/96mmHg 84bpm 98% SpO2   · Communication/Consultation:  HEP review/discussion. · Equipment provided today:  None today  · Recommendations/Intent for next treatment session: Next visit will focus on exercise progressions focusing on neutral spine strengthening and lumbopelvic control.      Total Treatment Billable Duration:  35 mins   PT Patient Time In/Time Out  Time In: 1000  Time Out: [address] [facility], PT

## 2022-05-06 NOTE — THERAPY DISCHARGE
Laney Cavazos  : 1970  Primary: Corinne [facility] [employer] [facility] [employer]*  Secondary:  [address] at [address] [facility], [address] [place] [address], [address] [place] [address]  [phone]   [phone]       OUTPATIENT PHYSICAL THERAPY:Discharge 2022    ICD-10: Treatment Diagnosis: Chronic low back pain with left sided sciatica, unspecified back pain laterality [M54.4, G89.29]                Treatment Diagnosis 2: DDD, lumbar [M51.36]                 Treatment Diagnosis 3: Lumbar stenosis with neurogenic claudication [M48.062]                 Treatment Diagnosis 4: Spondylolisthesis of lumbar region [M43.16]                Treatment Diagnosis 5: Facet arthropathy, lumbar [M47.816]                 Treatment Diagnosis 6: Lumbar radiculopathy [M54.16]                 Treatment Diagnosis 7: other abnormalities of gait and mobility (R26.89)   Precautions: Hypertension  Allergies: Patient has no known allergies. TREATMENT PLAN:  Effective Dates: 3/30/2022 TO 2022 (60 days). Frequency/Duration: 1-2 times a week for 60 Day(s) MEDICAL/REFERRING DIAGNOSIS:  Lumbago with sciatica, left side [M54.42]  Other intervertebral disc degeneration, lumbar region [M51.36]  Spondylolisthesis, lumbar region [M43.16]  Spondylosis without myelopathy or radiculopathy, lumbar region [M47.816]  Radiculopathy, lumbar region [M54.16]   DATE OF ONSET: Chronic LBP with exacerbation over last 6 months   REFERRING PHYSICIAN: EMORY Lucas MD Orders: Evaluate and Treat   Return MD Appointment: 2022      INITIAL ASSESSMENT:  Ms. Maria E Quispe is a 54 y.o. female presenting to physical therapy with complaints of chronic low back pain with an exacerbation beginning about six months ago. She reports back pain ongoing for years with insidious onset. She reports no significant acute injuries or surgeries to her back but a gradual onset with exacerbations over the years.  Imaging revealed \"levoscoliosis but there is also some listing involved as well. Sagittal view of the lumbar spine reveals advanced degenerative changes in the lower lumbar spine with facet arthropathy. There is a grade 1 anterior listhesis and slight kyphosis through the L4-5 segment. 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 She is now working in [employer] where she is seated most of the day as her back pain will not allow her to perform those duties. She reports she enjoys walking for exercise and is eager to return to PLOF or better as she now has to plan her day around how far she will have to walk before she takes a sitting break due to severe pain. She states the more activity she does the worse the pain gets until she can sit down. She also reports sleep is disturbed and she has to be careful how she gets up when transferring supine to sitting. She describes the pain as a constant pain in her low back with radiating symptoms all the way down both LEs; worse on the L than the R with sensation loss. She is eager to reduce pain and return to normal life with as little pain or modification as possible. Patient presents with increased pain, decreased strength, decreased ROM, decreased flexibility, impaired gait, impaired posture, impaired transfer ability, decreased activity tolerance, and overall impaired functional mobility. Patient is a good candidate for skilled physical therapy interventions to include manual therapy, therapeutic exercise, balance training, gait training, transfer training, postural re-education, body mechanics training, and pain modalities as needed. DISCHARGE SUMMARY 05/06/22: Ms. Jennyfer Jones has been seen for 8 sessions of PT with limited success.  She has not experienced any improvement in pain and is planning to proceed with surgery next month as a means to address her pain. She has a good understanding of her home exercise program to perform and activities to avoid until she proceeds with surgery. She will be discharged to [facility] as of today. PROBLEM LIST (Impacting functional limitations):  1. Decreased Strength  2. Decreased ADL/Functional Activities  3. Decreased Transfer Abilities  4. Decreased Ambulation Ability/Technique  5. Decreased Balance  6. Increased Pain  7. Decreased Activity Tolerance  8. Decreased Flexibility/Joint Mobility  9. Decreased [place] with Home Exercise Program INTERVENTIONS PLANNED: (Treatment may consist of any combination of the following)  1. Balance Exercise  2. Bed Mobility  3. Cold  4. Cryotherapy  5. Electrical Stimulation  6. Family Education  7. Gait Training  8. Heat  9. Home Exercise Program (HEP)  10. Manual Therapy  11. Neuromuscular Re-education/Strengthening  12. Range of Motion (ROM)  13. Therapeutic Activites  14. Therapeutic Exercise/Strengthening  15. Transfer Training     GOALS: (Goals have been discussed and agreed upon with patient.)  Short-Term Functional Goals: Time Frame: 3/30/2022 to 04/30/2022  1. Patient demonstrates independence with home exercise program without verbal cueing provided by therapist. (GOAL MET)   2. Patient will report no more than 5/10 low back pain at rest in order to demonstrate improved self pain control and tolerance. (NOT MET)   3. Patient will be educated in and demonstrate posterior pelvic tilt without performing valsalva maneuver. (GOAL MET)  4. Patient will be educated in and demonstrate proper squat lift technique in order to reduce stress on spine, improve safety, and reduce risk of further injury. (NOT MET)  Discharge Goals: Time Frame: 3/30/2022 to 05/29/2022  1. Patient will return to walking for exercise at prior level without pain as a limiting factor. (NOT MET)  2.  Patient will demonstrate bilateral hip strength of 4+/5 or better to facilitate improved gait and reduce back pain. (NOT MET)  3. Patient will perform sit to stand and sit to and from supine transfers without pain. (NOT MET)  4. Patient will return to regular recreational activities like shopping without having to take sitting breaks due to back pain. (NOT MET)  5. Patient will improve Modified Oswestry Scale score to 10/50 from 26/50. (NOT MET)    Outcome Measure: Tool Used: Modified Oswestry Low Back Pain Questionnaire  Score:  Initial: 26/50  Most Recent: /50 (Date: --- )   Interpretation of Score: Each section is scored on a 0-5 scale, 5 representing the greatest disability. The scores of each section are added together for a total score of 50. Patient denies any LE paresthesia except what is noted. Patient denies any increase of symptoms with cough, sneeze or valsalva. Patient denies any saddle paresthesia or bowel/bladder deficits. Patient does report some increase in symptoms when performing bowel movements and some numbness near saddle area; but no loss of bowel or bladder control. Patient denies any new headaches changes in vision, dizziness, vertigo, nausea, drop attacks, black outs, tinnitus, dysphagia, dysarthria, LE symptoms or bowel/bladder dysfunction. Observation/Orthostatic Postural Assessment:          Leg length near equal in supine and long sitting after bridging. R lateral trunk lean in standing. Palpation:          Patient is non tender to palpation near lumbar paraspinals. ROM:    AROM (degrees)   Lumbar Flexion WNL radiating symptoms worse with repeated motion    Lumbar Extension Impaired; radiating symptoms less with repeated motion    Lumbar Right Sidebend WNL   Lumbar Left Sidebend WNL       Strength: Motion Tested Left   (*/5) Right  (*/5)   Hip Flexion 4- 4   Hip IR 4- 4   Hip ER 4- 4   Knee Flexion 4 4   Knee Extension 5 5   Ankle Dorsiflexion 5 5   Ankle Plantarflexion 4 4   Gross core strength: Impaired based on observation of transfers.     Special Tests: Neural tension tests: Passive straight leg raise (SLR) test is negative. Crossed SLR test is negative. Passive Accessory Motion:         Hip, knee, ankle ROM grossly WNL normal end feel. Neurological Screen:              Myotomes: Key muscle strength testing through bilateral LE is intact at time of examination. Dermatomes: Sensation to light touch for bilateral LE is intact at time of examination. Reflexes: Patellar (L3/ L4): 1+                 Achilles (S1/ S2): 0       Functional Mobility:   Patient ambulates without assistive device with significant R lateral trunk lean. Antalgic gait. Balance:         Patient able to maintain SLS bilaterally for 10 seconds. Impaired based on patient report, slowed gait speed during turning, patient states at times she finds herself seeking UE support on furniture when feeling unstable. Medical Necessity:   · Patient is expected to demonstrate progress in strength, range of motion, balance, coordination and functional technique to improve safety during ADLs, prevent risk of further injury, reduce pain. .  · Skilled intervention continues to be required due to need for exercise progressions and modifications as indicated. .  Reason for Services/Other Comments:  · Patient continues to require skilled intervention due to need for plan of care modifications and exceptions based on patient presentation. .    Rehabilitation Potential For Stated Goals: Good  Regarding Laney Cavazos's therapy, I certify that the treatment plan above will be carried out by a therapist or under their direction. Thank you for this referral,  Gregory Palacios PT     Referring Physician Signature: EMORY Zavala No Signature is Required for this note.

## 2022-05-11 ENCOUNTER — APPOINTMENT (OUTPATIENT)
Dept: PHYSICAL THERAPY | Age: 52
End: 2022-05-11
Attending: PHYSICIAN ASSISTANT
Payer: COMMERCIAL

## 2022-05-13 ENCOUNTER — APPOINTMENT (OUTPATIENT)
Dept: PHYSICAL THERAPY | Age: 52
End: 2022-05-13
Attending: PHYSICIAN ASSISTANT
Payer: COMMERCIAL

## 2022-05-31 ENCOUNTER — PREP FOR PROCEDURE (OUTPATIENT)
Dept: ORTHOPEDIC SURGERY | Age: 52
End: 2022-05-31

## 2022-05-31 PROBLEM — M40.205 KYPHOSIS OF THORACOLUMBAR REGION: Status: ACTIVE | Noted: 2022-05-31

## 2022-05-31 PROBLEM — M54.16 LUMBAR RADICULOPATHY: Status: ACTIVE | Noted: 2022-05-31

## 2022-06-03 ENCOUNTER — OFFICE VISIT (OUTPATIENT)
Dept: FAMILY MEDICINE CLINIC | Facility: CLINIC | Age: 52
End: 2022-06-03
Payer: COMMERCIAL

## 2022-06-03 VITALS
TEMPERATURE: 98.3 F | HEIGHT: 67 IN | SYSTOLIC BLOOD PRESSURE: 131 MMHG | OXYGEN SATURATION: 98 % | HEART RATE: 90 BPM | DIASTOLIC BLOOD PRESSURE: 89 MMHG | WEIGHT: 219.25 LBS | BODY MASS INDEX: 34.41 KG/M2

## 2022-06-03 DIAGNOSIS — N92.6 IRREGULAR PERIODS: ICD-10-CM

## 2022-06-03 DIAGNOSIS — N95.1 PERIMENOPAUSAL: ICD-10-CM

## 2022-06-03 DIAGNOSIS — Z12.31 ENCOUNTER FOR SCREENING MAMMOGRAM FOR MALIGNANT NEOPLASM OF BREAST: ICD-10-CM

## 2022-06-03 DIAGNOSIS — Z01.419 ENCOUNTER FOR ROUTINE GYNECOLOGICAL EXAMINATION WITH PAPANICOLAOU SMEAR OF CERVIX: Primary | ICD-10-CM

## 2022-06-03 PROCEDURE — 99396 PREV VISIT EST AGE 40-64: CPT | Performed by: PHYSICIAN ASSISTANT

## 2022-06-03 ASSESSMENT — ENCOUNTER SYMPTOMS: GASTROINTESTINAL NEGATIVE: 1

## 2022-06-03 NOTE — PATIENT INSTRUCTIONS
Patient Education        Well Visit, Ages 25 to 48: Care Instructions  Overview     Well visits can help you stay healthy. Your doctor has checked your overall health and may have suggested ways to take good care of yourself. Your doctor also may have recommended tests. At home, you can help prevent illness withhealthy eating, regular exercise, and other steps. Follow-up care is a key part of your treatment and safety. Be sure to make and go to all appointments, and call your doctor if you are having problems. It's also a good idea to know your test results and keep alist of the medicines you take. How can you care for yourself at home?  Get screening tests that you and your doctor decide on. Screening helps find diseases before any symptoms appear.  Eat healthy foods. Choose fruits, vegetables, whole grains, protein, and low-fat dairy foods. Limit fat, especially saturated fat. Reduce salt in your diet.  Limit alcohol. If you are a man, have no more than 2 drinks a day or 14 drinks a week. If you are a woman, have no more than 1 drink a day or 7 drinks a week.  Get at least 30 minutes of physical activity on most days of the week. Walking is a good choice. You also may want to do other activities, such as running, swimming, cycling, or playing tennis or team sports. Discuss any changes in your exercise program with your doctor.  Reach and stay at a healthy weight. This will lower your risk for many problems, such as obesity, diabetes, heart disease, and high blood pressure.  Do not smoke or allow others to smoke around you. If you need help quitting, talk to your doctor about stop-smoking programs and medicines. These can increase your chances of quitting for good.  Care for your mental health. It is easy to get weighed down by worry and stress. Learn strategies to manage stress, like deep breathing and mindfulness, and stay connected with your family and community.  If you find you often feel sad or hopeless, talk with your doctor. Treatment can help.  Talk to your doctor about whether you have any risk factors for sexually transmitted infections (STIs). You can help prevent STIs if you wait to have sex with a new partner (or partners) until you've each been tested for STIs. It also helps if you use condoms (male or female condoms) and if you limit your sex partners to one person who only has sex with you. Vaccines are available for some STIs, such as HPV.  Use birth control if it's important to you to prevent pregnancy. Talk with your doctor about the choices available and what might be best for you.  If you think you may have a problem with alcohol or drug use, talk to your doctor. This includes prescription medicines (such as amphetamines and opioids) and illegal drugs (such as cocaine and methamphetamine). Your doctor can help you figure out what type of treatment is best for you.  Protect your skin from too much sun. When you're outdoors from 10 a.m. to 4 p.m., stay in the shade or cover up with clothing and a hat with a wide brim. Wear sunglasses that block UV rays. Even when it's cloudy, put broad-spectrum sunscreen (SPF 30 or higher) on any exposed skin.  See a dentist one or two times a year for checkups and to have your teeth cleaned.  Wear a seat belt in the car. When should you call for help? Watch closely for changes in your health, and be sure to contact your doctor if you have any problems or symptoms that concern you. Where can you learn more? Go to [email or web address] and sign in to your [employer] account. Enter P072 in the [facility] box to learn more about \"Well Visit, Ages 25 to 48: Care Instructions. \"     If you do not have an account, please click on the \"Sign Up Now\" link. Current as of: October 6, 2021               Content Version: 13.2  © 8608-8299 Healthwise, Incorporated. Care instructions adapted under license by [facility] ([facility]).  If you have questions about a medical condition or this instruction, always ask your healthcare professional. [place] [address] any warranty or liability for your use of this information.

## 2022-06-03 NOTE — PROGRESS NOTES
Chief Complaint   Patient presents with    Gynecologic Exam     irregular periods       Jaci Adams is a very pleasant 54 y.o. female who presents for routine gynecologic/well woman exam     Gynecologic History:  G 3 P 3 #SVDs 3; Patient's last menstrual period was 05/15/2022 (approximate). She reports that periods have become irregular  Tobacco: No Alcohol: a few drinks per week  Marital Status: single,   Last Pap: 3-4 years ago, dysplasia in 20's; Mammo: 1-2 in the past, no fhx of breast cancer. Colonoscopy: opts for cologuard, no Fhx of colon cancer  Sexual History:  not sexually active. Contraception:  tubal ligation  STD History: none    Past Medical History:   Diagnosis Date    GERD (gastroesophageal reflux disease)        Review of Systems   Constitutional: Negative for chills, fever and unexpected weight change. Gastrointestinal: Negative. Genitourinary: Negative for difficulty urinating, dysuria, frequency, hematuria, pelvic pain, urgency, vaginal bleeding, vaginal discharge and vaginal pain. Irregular periods        VITAL SIGNS:  /89 (Site: Left Upper Arm, Position: Sitting)   Pulse 90   Temp 98.3 °F (36.8 °C) (Temporal)   Ht 5' 7\" (1.702 m)   Wt 219 lb 4 oz (99.5 kg)   LMP 05/15/2022 (Approximate)   SpO2 98%   BMI 34.34 kg/m²     Physical Exam  Vitals reviewed. Exam conducted with a chaperone present. Constitutional:       Appearance: Normal appearance. She is obese. HENT:      Head: Normocephalic and atraumatic. Right Ear: External ear normal.      Left Ear: External ear normal.   Eyes:      Conjunctiva/sclera: Conjunctivae normal.   Cardiovascular:      Rate and Rhythm: Normal rate and regular rhythm. Heart sounds: No murmur heard. Pulmonary:      Effort: Pulmonary effort is normal.      Breath sounds: Normal breath sounds.    Chest:   Breasts:      Right: Normal. No swelling, inverted nipple, mass, nipple discharge, skin change, tenderness, axillary adenopathy or supraclavicular adenopathy. Left: Normal. No swelling, inverted nipple, mass, nipple discharge, skin change, tenderness, axillary adenopathy or supraclavicular adenopathy. Abdominal:      General: Abdomen is flat. Genitourinary:     General: Normal vulva. Labia:         Right: No rash or lesion. Left: No rash or lesion. Vagina: Normal.      Cervix: Cervical bleeding present. Uterus: Normal.       Adnexa: Right adnexa normal and left adnexa normal.   Musculoskeletal:      Cervical back: Neck supple. Lymphadenopathy:      Cervical: No cervical adenopathy. Upper Body:      Right upper body: No supraclavicular, axillary or pectoral adenopathy. Left upper body: No supraclavicular, axillary or pectoral adenopathy. Lower Body: No right inguinal adenopathy. No left inguinal adenopathy. Skin:     General: Skin is warm and dry. Neurological:      Mental Status: She is alert and oriented to person, place, and time. Psychiatric:         Attention and Perception: Attention normal.         Mood and Affect: Mood and affect normal.         Speech: Speech normal.         Behavior: Behavior normal.         Cognition and Memory: Memory normal.       ASSESSMENT AND PLAN:  Pao Lacy was seen today for gynecologic exam.    Diagnoses and all orders for this visit:    Encounter for routine gynecological examination with Papanicolaou smear of cervix  -     PAP IGP,Aptima HPV Age Guideline    Encounter for screening mammogram for malignant neoplasm of breast  -     NIKKY DIGITAL SCREEN W OR WO CAD BILATERAL;  Future    BMI 34.0-34.9,adult    Irregular periods    Perimenopausal        She was counseled regarding the importance of eating a healthy diet composed of fresh fruits, vegetables, whole grains, lean proteins, limiting processed foods and sweetened beverages, and I recommended engaging in regular exercise ( at least 150 minutes per week) to help maintain a healthy weight and BMI (<30) and to prevent the development or worsening of chronic medical conditions like hypertension, hyperlipidemia, and diabetes. She was also counseled on preventative maintenance applicable to her- pap q 3 years until age 72, annual mammograms from ages 54-65, colon cancer screening at age 39 (has cologuard but needs to check w/ insurance to see if covered before submitting), annual influenza vaccine and COVID-19 vaccines, TDaP q 10 years.      Ofelia Portillo PA-C

## 2022-06-07 LAB — AGE GDLN ACOG TESTING: NORMAL

## 2022-06-16 LAB
CYTOLOGIST CVX/VAG CYTO: NORMAL
CYTOLOGY CVX/VAG DOC THIN PREP: NORMAL
HPV APTIMA: NEGATIVE
Lab: NORMAL
PATH REPORT.FINAL DX SPEC: NORMAL
STAT OF ADQ CVX/VAG CYTO-IMP: NORMAL

## 2022-06-21 ENCOUNTER — HOSPITAL ENCOUNTER (OUTPATIENT)
Dept: PREADMISSION TESTING | Age: 52
Discharge: HOME | End: 2022-06-24
Payer: COMMERCIAL

## 2022-06-21 VITALS
DIASTOLIC BLOOD PRESSURE: 77 MMHG | HEIGHT: 68 IN | RESPIRATION RATE: 18 BRPM | BODY MASS INDEX: 33.96 KG/M2 | HEART RATE: 87 BPM | WEIGHT: 224.1 LBS | SYSTOLIC BLOOD PRESSURE: 135 MMHG | TEMPERATURE: 97 F | OXYGEN SATURATION: 98 %

## 2022-06-21 LAB
ANION GAP SERPL CALC-SCNC: 5 MMOL/L (ref 7–16)
APPEARANCE UR: CLEAR
BACTERIA SPEC CULT: NORMAL
BACTERIA URNS QL MICRO: 0 /HPF
BASOPHILS # BLD: 0.1 K/UL (ref 0–0.2)
BASOPHILS NFR BLD: 1 % (ref 0–2)
BILIRUB UR QL: NEGATIVE
BUN SERPL-MCNC: 12 MG/DL (ref 6–23)
CALCIUM SERPL-MCNC: 9.4 MG/DL (ref 8.3–10.4)
CASTS URNS QL MICRO: ABNORMAL /LPF
CHLORIDE SERPL-SCNC: 101 MMOL/L (ref 98–107)
CO2 SERPL-SCNC: 32 MMOL/L (ref 21–32)
COLOR UR: YELLOW
CREAT SERPL-MCNC: 0.52 MG/DL (ref 0.6–1)
DIFFERENTIAL METHOD BLD: NORMAL
EOSINOPHIL # BLD: 0.1 K/UL (ref 0–0.8)
EOSINOPHIL NFR BLD: 1 % (ref 0.5–7.8)
EPI CELLS #/AREA URNS HPF: ABNORMAL /HPF
ERYTHROCYTE [DISTWIDTH] IN BLOOD BY AUTOMATED COUNT: 14.6 % (ref 11.9–14.6)
GLUCOSE SERPL-MCNC: 104 MG/DL (ref 65–100)
GLUCOSE UR STRIP.AUTO-MCNC: NEGATIVE MG/DL
HCT VFR BLD AUTO: 36 % (ref 35.8–46.3)
HGB BLD-MCNC: 11.7 G/DL (ref 11.7–15.4)
HGB UR QL STRIP: ABNORMAL
IMM GRANULOCYTES # BLD AUTO: 0 K/UL (ref 0–0.5)
IMM GRANULOCYTES NFR BLD AUTO: 0 % (ref 0–5)
KETONES UR QL STRIP.AUTO: NEGATIVE MG/DL
LEUKOCYTE ESTERASE UR QL STRIP.AUTO: NEGATIVE
LYMPHOCYTES # BLD: 2.1 K/UL (ref 0.5–4.6)
LYMPHOCYTES NFR BLD: 24 % (ref 13–44)
MCH RBC QN AUTO: 26.8 PG (ref 26.1–32.9)
MCHC RBC AUTO-ENTMCNC: 32.5 G/DL (ref 31.4–35)
MCV RBC AUTO: 82.4 FL (ref 79.6–97.8)
MONOCYTES # BLD: 0.6 K/UL (ref 0.1–1.3)
MONOCYTES NFR BLD: 7 % (ref 4–12)
NEUTS SEG # BLD: 5.7 K/UL (ref 1.7–8.2)
NEUTS SEG NFR BLD: 67 % (ref 43–78)
NITRITE UR QL STRIP.AUTO: NEGATIVE
NRBC # BLD: 0 K/UL (ref 0–0.2)
PH UR STRIP: 6 [PH] (ref 5–9)
PLATELET # BLD AUTO: 339 K/UL (ref 150–450)
PMV BLD AUTO: 10.2 FL (ref 9.4–12.3)
POTASSIUM SERPL-SCNC: 3.6 MMOL/L (ref 3.5–5.1)
PROT UR STRIP-MCNC: NEGATIVE MG/DL
RBC # BLD AUTO: 4.37 M/UL (ref 4.05–5.2)
RBC #/AREA URNS HPF: ABNORMAL /HPF
SERVICE CMNT-IMP: NORMAL
SODIUM SERPL-SCNC: 138 MMOL/L (ref 136–145)
SP GR UR REFRACTOMETRY: 1.01 (ref 1–1.02)
UROBILINOGEN UR QL STRIP.AUTO: 0.2 EU/DL (ref 0.2–1)
WBC # BLD AUTO: 8.6 K/UL (ref 4.3–11.1)
WBC URNS QL MICRO: 0 /HPF

## 2022-06-21 PROCEDURE — 85025 COMPLETE CBC W/AUTO DIFF WBC: CPT

## 2022-06-21 PROCEDURE — 80048 BASIC METABOLIC PNL TOTAL CA: CPT

## 2022-06-21 PROCEDURE — 81001 URINALYSIS AUTO W/SCOPE: CPT

## 2022-06-21 PROCEDURE — 87641 MR-STAPH DNA AMP PROBE: CPT

## 2022-06-21 ASSESSMENT — PAIN DESCRIPTION - FREQUENCY: FREQUENCY: INTERMITTENT

## 2022-06-21 ASSESSMENT — PAIN DESCRIPTION - LOCATION: LOCATION: BACK

## 2022-06-21 ASSESSMENT — PAIN SCALES - GENERAL: PAINLEVEL_OUTOF10: 6

## 2022-06-21 ASSESSMENT — PAIN - FUNCTIONAL ASSESSMENT: PAIN_FUNCTIONAL_ASSESSMENT: PREVENTS OR INTERFERES SOME ACTIVE ACTIVITIES AND ADLS

## 2022-06-21 ASSESSMENT — PAIN DESCRIPTION - PAIN TYPE: TYPE: ACUTE PAIN

## 2022-06-21 ASSESSMENT — PAIN DESCRIPTION - DESCRIPTORS: DESCRIPTORS: ACHING

## 2022-06-21 NOTE — PRE-PROCEDURE INSTRUCTIONS
Patient verified name, , and surgery as listed in [facility]. Patient provided medical/health information and PTA medications to the best of their ability. TYPE  CASE: 3  Orders per surgeon: were  received  Labs per surgeon: cbc w/ diff, bmp, ua mrsa collected and results in [facility]. Labs per anesthesia protocol: no additional  EKG:  Not required per protocol. Patient denies any hx of CAD     Nasal Swab collected per MD order. Patient provided with and instructed on education handouts including Guide to Surgery, blood transfusions, pain management, and hand hygiene for the family and community, and [facility] brochure. Hibiclens and instructions given per hospital policy. Original medication prescription bottles not visualized during patient appointment. Patient teach back successful and patient demonstrates knowledge of instruction. PLEASE CONTINUE TAKING ALL PRESCRIPTION MEDICATIONS UP TO THE DAY OF SURGERY UNLESS OTHERWISE DIRECTED BELOW. DISCONTINUE all vitamins and supplements 7 days prior to surgery. DISCONTINUE Non-Steriodal Anti-Inflammatory (NSAIDS) such as Advil and Aleve 5 days prior to surgery. Home Medications to take  the day of surgery    OMEPRAZOLE           Home Medications   to Hold   DO NOT TAKE HYDROCHLOROTHIAZIDE MORNING OF SURGERY   HOLD RELAFEN 5 DAYS PRIOR TO SURGERY     Comments       On the day before surgery please take Acetaminophen 1000mg in the morning and then again before bed. You may substitute for Tylenol 650 mg. Please do not bring home medications with you on the day of surgery unless otherwise directed by your nurse. If you are instructed to bring home medications, please give them to your nurse as they will be administered by the nursing staff. If you have any questions, please call [facility] [phone] or [address] [place] [address] [phone].     A copy of this note was provided to the patient for reference.

## 2022-06-21 NOTE — PRE-PROCEDURE INSTRUCTIONS
Results for Gabrielle Botello (MRN [id number]) as of 6/21/2022 15:08   Ref.  Range 6/21/2022 10:17 6/21/2022 10:41   Sodium Latest Ref Range: 136 - 145 mmol/L  138   Potassium Latest Ref Range: 3.5 - 5.1 mmol/L  3.6   Chloride Latest Ref Range: 98 - 107 mmol/L  101   CO2 Latest Ref Range: 21 - 32 mmol/L  32   BUN,BUNPL Latest Ref Range: 6 - 23 MG/DL  12   Creatinine Latest Ref Range: 0.6 - 1.0 MG/DL  0.52 (L)   Anion Gap Latest Ref Range: 7 - 16 mmol/L  5 (L)   GFR Non- Latest Ref Range: >60 ml/min/1.73m2  >60   GFR African American Latest Ref Range: >60 ml/min/1.73m2  >60   GLUCOSE, FASTING,GF Latest Ref Range: 65 - 100 mg/dL  104 (H)   CALCIUM, SERUM, [id number] Latest Ref Range: 8.3 - 10.4 MG/DL  9.4   WBC Latest Ref Range: 4.3 - 11.1 K/uL  8.6   RBC Latest Ref Range: 4.05 - 5.2 M/uL  4.37   Hemoglobin Quant Latest Ref Range: 11.7 - 15.4 g/dL  11.7   Hematocrit Latest Ref Range: 35.8 - 46.3 %  36.0   MCV Latest Ref Range: 79.6 - 97.8 FL  82.4   MCH Latest Ref Range: 26.1 - 32.9 PG  26.8   MCHC Latest Ref Range: 31.4 - 35.0 g/dL  32.5   MPV Latest Ref Range: 9.4 - 12.3 FL  10.2   RDW Latest Ref Range: 11.9 - 14.6 %  14.6   Platelet Count Latest Ref Range: 150 - 450 K/uL  339   Absolute Mono # Latest Ref Range: 0.1 - 1.3 K/UL  0.6   Eosinophils % Latest Ref Range: 0.5 - 7.8 %  1   Basophils Absolute Latest Ref Range: 0.0 - 0.2 K/UL  0.1   Differential Type Latest Units:    AUTOMATED   Seg Neutrophils Latest Ref Range: 43 - 78 %  67   Segs Absolute Latest Ref Range: 1.7 - 8.2 K/UL  5.7   Lymphocytes Latest Ref Range: 13 - 44 %  24   Absolute Lymph # Latest Ref Range: 0.5 - 4.6 K/UL  2.1   Monocytes Latest Ref Range: 4.0 - 12.0 %  7   Absolute Eos # Latest Ref Range: 0.0 - 0.8 K/UL  0.1   Basophils Latest Ref Range: 0.0 - 2.0 %  1   Immature Granulocytes Latest Ref Range: 0.0 - 5.0 %  0   Nucleated Red Blood Cells Latest Ref Range: 0.0 - 0.2 K/uL  0.00   Absolute Immature Granulocyte Latest Ref Range: 0.0 - 0.5 K/UL  0.0   Color, UA Latest Units:   YELLOW    Glucose, UA Latest Units: mg/dL Negative    Bilirubin, Urine Latest Ref Range: NEG   Negative    Ketones, Urine Latest Ref Range: NEG mg/dL Negative    Specific Gravity, UA Latest Ref Range: 1.001 - 1.023   1.007    Blood, Urine Latest Ref Range: NEG   MODERATE (A)    Protein, UA Latest Ref Range: NEG mg/dL Negative    Urobilinogen, Urine Latest Ref Range: 0.2 - 1.0 EU/dL 0.2    Nitrite, Urine Latest Ref Range: NEG   Negative    Leukocyte Esterase, Urine Latest Ref Range: NEG   Negative    Appearance Latest Units:   CLEAR    pH, Urine Latest Ref Range: 5.0 - 9.0   6.0    Casts Latest Ref Range: 0 /lpf 0-3    WBC, UA Latest Ref Range: 0 /hpf 0    RBC, UA Latest Ref Range: 0 /hpf 0-3    Epithelial Cells, UA Latest Ref Range: 0 /hpf 0-3    Bacteria, UA Latest Ref Range: 0 /hpf 0    MSSA/MRSA SCREEN BY PCR Unknown  Rpt   Special Requests Latest Units:    NO SPECIAL REQUESTS

## 2022-06-28 NOTE — PERIOP NOTE
Directly informed patient and or family member of pre op arrival time 0 on 6/29. All questions answered. Pre op instructions reviewed. Left contact information for any additional questions or needs.

## 2022-06-29 ENCOUNTER — APPOINTMENT (OUTPATIENT)
Dept: GENERAL RADIOLOGY | Age: 52
DRG: 455 | End: 2022-06-29
Attending: ORTHOPAEDIC SURGERY
Payer: COMMERCIAL

## 2022-06-29 ENCOUNTER — ANESTHESIA EVENT (OUTPATIENT)
Dept: SURGERY | Age: 52
DRG: 455 | End: 2022-06-29
Payer: COMMERCIAL

## 2022-06-29 ENCOUNTER — HOSPITAL ENCOUNTER (INPATIENT)
Age: 52
LOS: 1 days | Discharge: HOME HEALTH | DRG: 455 | End: 2022-06-30
Attending: ORTHOPAEDIC SURGERY | Admitting: ORTHOPAEDIC SURGERY
Payer: COMMERCIAL

## 2022-06-29 ENCOUNTER — ANESTHESIA (OUTPATIENT)
Dept: SURGERY | Age: 52
DRG: 455 | End: 2022-06-29
Payer: COMMERCIAL

## 2022-06-29 DIAGNOSIS — Z98.1 S/P LUMBAR FUSION: Primary | ICD-10-CM

## 2022-06-29 DIAGNOSIS — M54.16 LUMBAR RADICULOPATHY: ICD-10-CM

## 2022-06-29 PROBLEM — M48.062 SPINAL STENOSIS OF LUMBAR REGION WITH NEUROGENIC CLAUDICATION: Status: ACTIVE | Noted: 2022-04-07

## 2022-06-29 PROBLEM — M43.16 SPONDYLOLISTHESIS AT L4-L5 LEVEL: Status: ACTIVE | Noted: 2022-04-07

## 2022-06-29 LAB
ABO + RH BLD: NORMAL
BLOOD GROUP ANTIBODIES SERPL: NORMAL
POTASSIUM BLD-SCNC: 3.5 MMOL/L (ref 3.5–5.1)
SPECIMEN EXP DATE BLD: NORMAL

## 2022-06-29 PROCEDURE — 2500000003 HC RX 250 WO HCPCS: Performed by: NURSE ANESTHETIST, CERTIFIED REGISTERED

## 2022-06-29 PROCEDURE — 22558 ARTHRD ANT NTRBD MIN DSC LUM: CPT | Performed by: ORTHOPAEDIC SURGERY

## 2022-06-29 PROCEDURE — C1769 GUIDE WIRE: HCPCS | Performed by: ORTHOPAEDIC SURGERY

## 2022-06-29 PROCEDURE — 1100000000 HC RM PRIVATE

## 2022-06-29 PROCEDURE — C1889 IMPLANT/INSERT DEVICE, NOC: HCPCS | Performed by: ORTHOPAEDIC SURGERY

## 2022-06-29 PROCEDURE — 63048 LAM FACETEC &FORAMOT EA ADDL: CPT | Performed by: ORTHOPAEDIC SURGERY

## 2022-06-29 PROCEDURE — 2580000003 HC RX 258: Performed by: ANESTHESIOLOGY

## 2022-06-29 PROCEDURE — 6370000000 HC RX 637 (ALT 250 FOR IP): Performed by: ORTHOPAEDIC SURGERY

## 2022-06-29 PROCEDURE — 6360000002 HC RX W HCPCS: Performed by: ANESTHESIOLOGY

## 2022-06-29 PROCEDURE — A4217 STERILE WATER/SALINE, 500 ML: HCPCS | Performed by: ORTHOPAEDIC SURGERY

## 2022-06-29 PROCEDURE — 97530 THERAPEUTIC ACTIVITIES: CPT

## 2022-06-29 PROCEDURE — 0ST20ZZ RESECTION OF LUMBAR VERTEBRAL DISC, OPEN APPROACH: ICD-10-PCS | Performed by: ORTHOPAEDIC SURGERY

## 2022-06-29 PROCEDURE — 84132 ASSAY OF SERUM POTASSIUM: CPT

## 2022-06-29 PROCEDURE — 2580000003 HC RX 258: Performed by: ORTHOPAEDIC SURGERY

## 2022-06-29 PROCEDURE — 3700000001 HC ADD 15 MINUTES (ANESTHESIA): Performed by: ORTHOPAEDIC SURGERY

## 2022-06-29 PROCEDURE — 97165 OT EVAL LOW COMPLEX 30 MIN: CPT

## 2022-06-29 PROCEDURE — 22612 ARTHRD PST TQ 1NTRSPC LUMBAR: CPT | Performed by: ORTHOPAEDIC SURGERY

## 2022-06-29 PROCEDURE — 2709999900 HC NON-CHARGEABLE SUPPLY: Performed by: ORTHOPAEDIC SURGERY

## 2022-06-29 PROCEDURE — 3700000000 HC ANESTHESIA ATTENDED CARE: Performed by: ORTHOPAEDIC SURGERY

## 2022-06-29 PROCEDURE — C1713 ANCHOR/SCREW BN/BN,TIS/BN: HCPCS | Performed by: ORTHOPAEDIC SURGERY

## 2022-06-29 PROCEDURE — 6360000002 HC RX W HCPCS: Performed by: ORTHOPAEDIC SURGERY

## 2022-06-29 PROCEDURE — 22840 INSERT SPINE FIXATION DEVICE: CPT | Performed by: ORTHOPAEDIC SURGERY

## 2022-06-29 PROCEDURE — 7100000001 HC PACU RECOVERY - ADDTL 15 MIN: Performed by: ORTHOPAEDIC SURGERY

## 2022-06-29 PROCEDURE — 97161 PT EVAL LOW COMPLEX 20 MIN: CPT

## 2022-06-29 PROCEDURE — 2580000003 HC RX 258: Performed by: NURSE ANESTHETIST, CERTIFIED REGISTERED

## 2022-06-29 PROCEDURE — 22853 INSJ BIOMECHANICAL DEVICE: CPT | Performed by: ORTHOPAEDIC SURGERY

## 2022-06-29 PROCEDURE — 0SG00K1 FUSION OF LUMBAR VERTEBRAL JOINT WITH NONAUTOLOGOUS TISSUE SUBSTITUTE, POSTERIOR APPROACH, POSTERIOR COLUMN, OPEN APPROACH: ICD-10-PCS | Performed by: ORTHOPAEDIC SURGERY

## 2022-06-29 PROCEDURE — 3600000014 HC SURGERY LEVEL 4 ADDTL 15MIN: Performed by: ORTHOPAEDIC SURGERY

## 2022-06-29 PROCEDURE — 97535 SELF CARE MNGMENT TRAINING: CPT

## 2022-06-29 PROCEDURE — 97116 GAIT TRAINING THERAPY: CPT

## 2022-06-29 PROCEDURE — 6370000000 HC RX 637 (ALT 250 FOR IP): Performed by: ANESTHESIOLOGY

## 2022-06-29 PROCEDURE — 2500000003 HC RX 250 WO HCPCS

## 2022-06-29 PROCEDURE — 6360000002 HC RX W HCPCS: Performed by: NURSE ANESTHETIST, CERTIFIED REGISTERED

## 2022-06-29 PROCEDURE — 72100 X-RAY EXAM L-S SPINE 2/3 VWS: CPT

## 2022-06-29 PROCEDURE — 2500000003 HC RX 250 WO HCPCS: Performed by: ORTHOPAEDIC SURGERY

## 2022-06-29 PROCEDURE — 63047 LAM FACETEC & FORAMOT LUMBAR: CPT | Performed by: ORTHOPAEDIC SURGERY

## 2022-06-29 PROCEDURE — 20930 SP BONE ALGRFT MORSEL ADD-ON: CPT | Performed by: ORTHOPAEDIC SURGERY

## 2022-06-29 PROCEDURE — 86901 BLOOD TYPING SEROLOGIC RH(D): CPT

## 2022-06-29 PROCEDURE — 7100000000 HC PACU RECOVERY - FIRST 15 MIN: Performed by: ORTHOPAEDIC SURGERY

## 2022-06-29 PROCEDURE — 2720000010 HC SURG SUPPLY STERILE: Performed by: ORTHOPAEDIC SURGERY

## 2022-06-29 PROCEDURE — 3600000004 HC SURGERY LEVEL 4 BASE: Performed by: ORTHOPAEDIC SURGERY

## 2022-06-29 PROCEDURE — 0SG00A0 FUSION OF LUMBAR VERTEBRAL JOINT WITH INTERBODY FUSION DEVICE, ANTERIOR APPROACH, ANTERIOR COLUMN, OPEN APPROACH: ICD-10-PCS | Performed by: ORTHOPAEDIC SURGERY

## 2022-06-29 DEVICE — POLYAXIAL CORTICAL SCREW
Type: IMPLANTABLE DEVICE | Site: SPINE LUMBAR | Status: FUNCTIONAL
Brand: XIA 4.5 SYSTEM -  XIA CT

## 2022-06-29 DEVICE — BIO DBM PLUS PUTTY (WITH CANCELLOUS)
Type: IMPLANTABLE DEVICE | Site: SPINE LUMBAR | Status: FUNCTIONAL
Brand: BIO DBM

## 2022-06-29 DEVICE — SPACER SPNL 7 DEG SM 50X18 MM PROLIFT: Type: IMPLANTABLE DEVICE | Site: SPINE LUMBAR | Status: FUNCTIONAL

## 2022-06-29 DEVICE — BLOCKER
Type: IMPLANTABLE DEVICE | Site: SPINE LUMBAR | Status: FUNCTIONAL
Brand: XIA 4.5 SYSTEM -  XIA CT

## 2022-06-29 DEVICE — GRAFT BNE XL: Type: IMPLANTABLE DEVICE | Site: SPINE LUMBAR | Status: FUNCTIONAL

## 2022-06-29 DEVICE — VITALLIUM PREBENT AND PRECUT ROD WITHOUT HEX
Type: IMPLANTABLE DEVICE | Site: SPINE LUMBAR | Status: FUNCTIONAL
Brand: XIA 4 5

## 2022-06-29 RX ORDER — SODIUM CHLORIDE 9 MG/ML
INJECTION, SOLUTION INTRAVENOUS CONTINUOUS
Status: ACTIVE | OUTPATIENT
Start: 2022-06-29 | End: 2022-06-30

## 2022-06-29 RX ORDER — HYDROMORPHONE HCL 110MG/55ML
PATIENT CONTROLLED ANALGESIA SYRINGE INTRAVENOUS PRN
Status: DISCONTINUED | OUTPATIENT
Start: 2022-06-29 | End: 2022-06-29 | Stop reason: SDUPTHER

## 2022-06-29 RX ORDER — SUCCINYLCHOLINE CHLORIDE 20 MG/ML
INJECTION INTRAMUSCULAR; INTRAVENOUS PRN
Status: DISCONTINUED | OUTPATIENT
Start: 2022-06-29 | End: 2022-06-29 | Stop reason: SDUPTHER

## 2022-06-29 RX ORDER — PROPOFOL 10 MG/ML
INJECTION, EMULSION INTRAVENOUS PRN
Status: DISCONTINUED | OUTPATIENT
Start: 2022-06-29 | End: 2022-06-29 | Stop reason: SDUPTHER

## 2022-06-29 RX ORDER — PROCHLORPERAZINE EDISYLATE 5 MG/ML
5 INJECTION INTRAMUSCULAR; INTRAVENOUS
Status: DISCONTINUED | OUTPATIENT
Start: 2022-06-29 | End: 2022-06-29 | Stop reason: HOSPADM

## 2022-06-29 RX ORDER — MIDAZOLAM HYDROCHLORIDE 1 MG/ML
INJECTION INTRAMUSCULAR; INTRAVENOUS PRN
Status: DISCONTINUED | OUTPATIENT
Start: 2022-06-29 | End: 2022-06-29 | Stop reason: SDUPTHER

## 2022-06-29 RX ORDER — ACETAMINOPHEN 500 MG
1000 TABLET ORAL ONCE
Status: COMPLETED | OUTPATIENT
Start: 2022-06-29 | End: 2022-06-29

## 2022-06-29 RX ORDER — SODIUM CHLORIDE 0.9 % (FLUSH) 0.9 %
5-40 SYRINGE (ML) INJECTION PRN
Status: DISCONTINUED | OUTPATIENT
Start: 2022-06-29 | End: 2022-06-29 | Stop reason: HOSPADM

## 2022-06-29 RX ORDER — MIDAZOLAM HYDROCHLORIDE 2 MG/2ML
2 INJECTION, SOLUTION INTRAMUSCULAR; INTRAVENOUS
Status: DISCONTINUED | OUTPATIENT
Start: 2022-06-29 | End: 2022-06-29 | Stop reason: HOSPADM

## 2022-06-29 RX ORDER — GLYCOPYRROLATE 0.2 MG/ML
INJECTION INTRAMUSCULAR; INTRAVENOUS PRN
Status: DISCONTINUED | OUTPATIENT
Start: 2022-06-29 | End: 2022-06-29 | Stop reason: SDUPTHER

## 2022-06-29 RX ORDER — ONDANSETRON 2 MG/ML
4 INJECTION INTRAMUSCULAR; INTRAVENOUS EVERY 6 HOURS PRN
Status: DISCONTINUED | OUTPATIENT
Start: 2022-06-29 | End: 2022-06-30 | Stop reason: HOSPADM

## 2022-06-29 RX ORDER — OXYCODONE HYDROCHLORIDE 5 MG/1
5 TABLET ORAL EVERY 6 HOURS PRN
Qty: 28 TABLET | Refills: 0 | Status: SHIPPED | OUTPATIENT
Start: 2022-06-29 | End: 2022-07-06

## 2022-06-29 RX ORDER — MAGNESIUM HYDROXIDE 1200 MG/15ML
LIQUID ORAL CONTINUOUS PRN
Status: COMPLETED | OUTPATIENT
Start: 2022-06-29 | End: 2022-06-29

## 2022-06-29 RX ORDER — OXYCODONE HYDROCHLORIDE 5 MG/1
10 TABLET ORAL EVERY 4 HOURS PRN
Status: DISCONTINUED | OUTPATIENT
Start: 2022-06-29 | End: 2022-06-30 | Stop reason: HOSPADM

## 2022-06-29 RX ORDER — FENTANYL CITRATE 50 UG/ML
100 INJECTION, SOLUTION INTRAMUSCULAR; INTRAVENOUS
Status: DISCONTINUED | OUTPATIENT
Start: 2022-06-29 | End: 2022-06-29 | Stop reason: HOSPADM

## 2022-06-29 RX ORDER — ACETAMINOPHEN 500 MG
1000 TABLET ORAL EVERY 6 HOURS PRN
COMMUNITY

## 2022-06-29 RX ORDER — ROCURONIUM BROMIDE 10 MG/ML
INJECTION, SOLUTION INTRAVENOUS PRN
Status: DISCONTINUED | OUTPATIENT
Start: 2022-06-29 | End: 2022-06-29 | Stop reason: SDUPTHER

## 2022-06-29 RX ORDER — LIDOCAINE HYDROCHLORIDE 20 MG/ML
INJECTION, SOLUTION EPIDURAL; INFILTRATION; INTRACAUDAL; PERINEURAL PRN
Status: DISCONTINUED | OUTPATIENT
Start: 2022-06-29 | End: 2022-06-29 | Stop reason: SDUPTHER

## 2022-06-29 RX ORDER — PROMETHAZINE HYDROCHLORIDE 12.5 MG/1
12.5 TABLET ORAL EVERY 6 HOURS PRN
Status: DISCONTINUED | OUTPATIENT
Start: 2022-06-29 | End: 2022-06-30 | Stop reason: HOSPADM

## 2022-06-29 RX ORDER — MORPHINE SULFATE 4 MG/ML
2 INJECTION INTRAVENOUS
Status: DISCONTINUED | OUTPATIENT
Start: 2022-06-29 | End: 2022-06-30 | Stop reason: HOSPADM

## 2022-06-29 RX ORDER — IPRATROPIUM BROMIDE AND ALBUTEROL SULFATE 2.5; .5 MG/3ML; MG/3ML
1 SOLUTION RESPIRATORY (INHALATION)
Status: DISCONTINUED | OUTPATIENT
Start: 2022-06-29 | End: 2022-06-29 | Stop reason: HOSPADM

## 2022-06-29 RX ORDER — SODIUM CHLORIDE 9 MG/ML
INJECTION, SOLUTION INTRAVENOUS PRN
Status: DISCONTINUED | OUTPATIENT
Start: 2022-06-29 | End: 2022-06-29 | Stop reason: HOSPADM

## 2022-06-29 RX ORDER — SODIUM CHLORIDE, SODIUM LACTATE, POTASSIUM CHLORIDE, CALCIUM CHLORIDE 600; 310; 30; 20 MG/100ML; MG/100ML; MG/100ML; MG/100ML
INJECTION, SOLUTION INTRAVENOUS CONTINUOUS
Status: DISCONTINUED | OUTPATIENT
Start: 2022-06-29 | End: 2022-06-29 | Stop reason: HOSPADM

## 2022-06-29 RX ORDER — SODIUM CHLORIDE, SODIUM LACTATE, POTASSIUM CHLORIDE, CALCIUM CHLORIDE 600; 310; 30; 20 MG/100ML; MG/100ML; MG/100ML; MG/100ML
INJECTION, SOLUTION INTRAVENOUS CONTINUOUS PRN
Status: DISCONTINUED | OUTPATIENT
Start: 2022-06-29 | End: 2022-06-29 | Stop reason: SDUPTHER

## 2022-06-29 RX ORDER — CYCLOBENZAPRINE HCL 10 MG
10 TABLET ORAL 3 TIMES DAILY PRN
Status: DISCONTINUED | OUTPATIENT
Start: 2022-06-29 | End: 2022-06-30 | Stop reason: HOSPADM

## 2022-06-29 RX ORDER — NEOSTIGMINE METHYLSULFATE 1 MG/ML
INJECTION, SOLUTION INTRAVENOUS PRN
Status: DISCONTINUED | OUTPATIENT
Start: 2022-06-29 | End: 2022-06-29 | Stop reason: SDUPTHER

## 2022-06-29 RX ORDER — LIDOCAINE HYDROCHLORIDE ANHYDROUS AND DEXTROSE MONOHYDRATE .4; 5 G/100ML; G/100ML
INJECTION, SOLUTION INTRAVENOUS CONTINUOUS PRN
Status: DISCONTINUED | OUTPATIENT
Start: 2022-06-29 | End: 2022-06-29 | Stop reason: SDUPTHER

## 2022-06-29 RX ORDER — MORPHINE SULFATE 4 MG/ML
4 INJECTION INTRAVENOUS
Status: DISCONTINUED | OUTPATIENT
Start: 2022-06-29 | End: 2022-06-30 | Stop reason: HOSPADM

## 2022-06-29 RX ORDER — VANCOMYCIN HYDROCHLORIDE 1 G/20ML
INJECTION, POWDER, LYOPHILIZED, FOR SOLUTION INTRAVENOUS PRN
Status: DISCONTINUED | OUTPATIENT
Start: 2022-06-29 | End: 2022-06-29 | Stop reason: ALTCHOICE

## 2022-06-29 RX ORDER — ONDANSETRON 2 MG/ML
INJECTION INTRAMUSCULAR; INTRAVENOUS PRN
Status: DISCONTINUED | OUTPATIENT
Start: 2022-06-29 | End: 2022-06-29 | Stop reason: SDUPTHER

## 2022-06-29 RX ORDER — OXYCODONE HYDROCHLORIDE 5 MG/1
5 TABLET ORAL
Status: DISCONTINUED | OUTPATIENT
Start: 2022-06-29 | End: 2022-06-29 | Stop reason: HOSPADM

## 2022-06-29 RX ORDER — SODIUM CHLORIDE 0.9 % (FLUSH) 0.9 %
5-40 SYRINGE (ML) INJECTION EVERY 12 HOURS SCHEDULED
Status: DISCONTINUED | OUTPATIENT
Start: 2022-06-29 | End: 2022-06-30 | Stop reason: HOSPADM

## 2022-06-29 RX ORDER — OXYCODONE HYDROCHLORIDE 5 MG/1
5 TABLET ORAL EVERY 4 HOURS PRN
Status: DISCONTINUED | OUTPATIENT
Start: 2022-06-29 | End: 2022-06-30 | Stop reason: HOSPADM

## 2022-06-29 RX ORDER — SODIUM CHLORIDE 0.9 % (FLUSH) 0.9 %
5-40 SYRINGE (ML) INJECTION PRN
Status: DISCONTINUED | OUTPATIENT
Start: 2022-06-29 | End: 2022-06-30 | Stop reason: HOSPADM

## 2022-06-29 RX ORDER — SODIUM CHLORIDE 9 MG/ML
INJECTION, SOLUTION INTRAVENOUS PRN
Status: DISCONTINUED | OUTPATIENT
Start: 2022-06-29 | End: 2022-06-30 | Stop reason: HOSPADM

## 2022-06-29 RX ORDER — DEXAMETHASONE SODIUM PHOSPHATE 4 MG/ML
INJECTION, SOLUTION INTRA-ARTICULAR; INTRALESIONAL; INTRAMUSCULAR; INTRAVENOUS; SOFT TISSUE PRN
Status: DISCONTINUED | OUTPATIENT
Start: 2022-06-29 | End: 2022-06-29 | Stop reason: SDUPTHER

## 2022-06-29 RX ORDER — SUFENTANIL CITRATE 50 UG/ML
INJECTION EPIDURAL; INTRAVENOUS CONTINUOUS PRN
Status: DISCONTINUED | OUTPATIENT
Start: 2022-06-29 | End: 2022-06-29 | Stop reason: SDUPTHER

## 2022-06-29 RX ORDER — HYDROCHLOROTHIAZIDE 25 MG/1
25 TABLET ORAL DAILY
Status: DISCONTINUED | OUTPATIENT
Start: 2022-06-30 | End: 2022-06-30 | Stop reason: HOSPADM

## 2022-06-29 RX ORDER — LIDOCAINE HYDROCHLORIDE ANHYDROUS AND DEXTROSE MONOHYDRATE .4; 5 G/100ML; G/100ML
INJECTION, SOLUTION INTRAVENOUS CONTINUOUS PRN
Status: DISCONTINUED | OUTPATIENT
Start: 2022-06-29 | End: 2022-06-29

## 2022-06-29 RX ORDER — HYDROMORPHONE HYDROCHLORIDE 2 MG/ML
0.5 INJECTION, SOLUTION INTRAMUSCULAR; INTRAVENOUS; SUBCUTANEOUS EVERY 5 MIN PRN
Status: DISCONTINUED | OUTPATIENT
Start: 2022-06-29 | End: 2022-06-29 | Stop reason: HOSPADM

## 2022-06-29 RX ORDER — LIDOCAINE HYDROCHLORIDE 10 MG/ML
1 INJECTION, SOLUTION INFILTRATION; PERINEURAL
Status: DISCONTINUED | OUTPATIENT
Start: 2022-06-29 | End: 2022-06-29 | Stop reason: HOSPADM

## 2022-06-29 RX ORDER — DIPHENHYDRAMINE HYDROCHLORIDE 50 MG/ML
25 INJECTION INTRAMUSCULAR; INTRAVENOUS EVERY 6 HOURS PRN
Status: DISCONTINUED | OUTPATIENT
Start: 2022-06-29 | End: 2022-06-30 | Stop reason: HOSPADM

## 2022-06-29 RX ORDER — ACETAMINOPHEN 325 MG/1
650 TABLET ORAL EVERY 6 HOURS
Status: DISCONTINUED | OUTPATIENT
Start: 2022-06-29 | End: 2022-06-30 | Stop reason: HOSPADM

## 2022-06-29 RX ORDER — KETAMINE HYDROCHLORIDE 50 MG/ML
INJECTION, SOLUTION, CONCENTRATE INTRAMUSCULAR; INTRAVENOUS PRN
Status: DISCONTINUED | OUTPATIENT
Start: 2022-06-29 | End: 2022-06-29 | Stop reason: SDUPTHER

## 2022-06-29 RX ORDER — HALOPERIDOL 5 MG/ML
1 INJECTION INTRAMUSCULAR
Status: DISCONTINUED | OUTPATIENT
Start: 2022-06-29 | End: 2022-06-29 | Stop reason: HOSPADM

## 2022-06-29 RX ORDER — SODIUM CHLORIDE 0.9 % (FLUSH) 0.9 %
5-40 SYRINGE (ML) INJECTION EVERY 12 HOURS SCHEDULED
Status: DISCONTINUED | OUTPATIENT
Start: 2022-06-29 | End: 2022-06-29 | Stop reason: HOSPADM

## 2022-06-29 RX ORDER — DIPHENHYDRAMINE HCL 25 MG
25 CAPSULE ORAL EVERY 6 HOURS PRN
Status: DISCONTINUED | OUTPATIENT
Start: 2022-06-29 | End: 2022-06-30 | Stop reason: HOSPADM

## 2022-06-29 RX ADMIN — SODIUM CHLORIDE, POTASSIUM CHLORIDE, SODIUM LACTATE AND CALCIUM CHLORIDE: 600; 310; 30; 20 INJECTION, SOLUTION INTRAVENOUS at 06:14

## 2022-06-29 RX ADMIN — DEXAMETHASONE SODIUM PHOSPHATE 10 MG: 4 INJECTION, SOLUTION INTRAMUSCULAR; INTRAVENOUS at 08:14

## 2022-06-29 RX ADMIN — ONDANSETRON 4 MG: 2 INJECTION INTRAMUSCULAR; INTRAVENOUS at 08:14

## 2022-06-29 RX ADMIN — HYDROMORPHONE HYDROCHLORIDE 0.5 MG: 2 INJECTION, SOLUTION INTRAMUSCULAR; INTRAVENOUS; SUBCUTANEOUS at 11:43

## 2022-06-29 RX ADMIN — PROPOFOL 100 MG: 10 INJECTION, EMULSION INTRAVENOUS at 10:03

## 2022-06-29 RX ADMIN — PROPOFOL 150 MG: 10 INJECTION, EMULSION INTRAVENOUS at 07:23

## 2022-06-29 RX ADMIN — Medication 160 MG: at 07:23

## 2022-06-29 RX ADMIN — HYDROMORPHONE HYDROCHLORIDE 1 MG: 2 INJECTION INTRAMUSCULAR; INTRAVENOUS; SUBCUTANEOUS at 10:23

## 2022-06-29 RX ADMIN — PROPOFOL 50 MG: 10 INJECTION, EMULSION INTRAVENOUS at 07:32

## 2022-06-29 RX ADMIN — PHENYLEPHRINE HYDROCHLORIDE 100 MCG: 10 INJECTION INTRAVENOUS at 08:51

## 2022-06-29 RX ADMIN — SUFENTANIL CITRATE 0.3 MCG/KG/HR: 50 INJECTION EPIDURAL; INTRAVENOUS at 07:24

## 2022-06-29 RX ADMIN — PHENYLEPHRINE HYDROCHLORIDE 100 MCG: 10 INJECTION INTRAVENOUS at 07:57

## 2022-06-29 RX ADMIN — SODIUM CHLORIDE: 900 INJECTION INTRAVENOUS at 17:09

## 2022-06-29 RX ADMIN — Medication 3 AMPULE: at 06:14

## 2022-06-29 RX ADMIN — HYDROMORPHONE HYDROCHLORIDE 0.5 MG: 2 INJECTION, SOLUTION INTRAMUSCULAR; INTRAVENOUS; SUBCUTANEOUS at 10:22

## 2022-06-29 RX ADMIN — PHENYLEPHRINE HYDROCHLORIDE 100 MCG: 10 INJECTION INTRAVENOUS at 07:49

## 2022-06-29 RX ADMIN — PROPOFOL 50 MG: 10 INJECTION, EMULSION INTRAVENOUS at 07:42

## 2022-06-29 RX ADMIN — KETAMINE HYDROCHLORIDE 40 MG: 50 INJECTION, SOLUTION INTRAMUSCULAR; INTRAVENOUS at 07:23

## 2022-06-29 RX ADMIN — PROPOFOL 50 MG: 10 INJECTION, EMULSION INTRAVENOUS at 10:01

## 2022-06-29 RX ADMIN — OXYCODONE 10 MG: 5 TABLET ORAL at 19:28

## 2022-06-29 RX ADMIN — LIDOCAINE HYDROCHLORIDE 60 MG: 20 INJECTION, SOLUTION EPIDURAL; INFILTRATION; INTRACAUDAL; PERINEURAL at 07:23

## 2022-06-29 RX ADMIN — PHENYLEPHRINE HYDROCHLORIDE 100 MCG: 10 INJECTION INTRAVENOUS at 09:02

## 2022-06-29 RX ADMIN — SUFENTANIL CITRATE 10 MCG: 50 INJECTION EPIDURAL; INTRAVENOUS at 07:23

## 2022-06-29 RX ADMIN — PROPOFOL 125 MCG/KG/MIN: 10 INJECTION, EMULSION INTRAVENOUS at 07:24

## 2022-06-29 RX ADMIN — PHENYLEPHRINE HYDROCHLORIDE 100 MCG: 10 INJECTION INTRAVENOUS at 07:38

## 2022-06-29 RX ADMIN — KETAMINE HYDROCHLORIDE 44 MG: 50 INJECTION, SOLUTION INTRAMUSCULAR; INTRAVENOUS at 09:30

## 2022-06-29 RX ADMIN — SUFENTANIL CITRATE 0.5 MCG/KG/HR: 50 INJECTION EPIDURAL; INTRAVENOUS at 07:47

## 2022-06-29 RX ADMIN — PHENYLEPHRINE HYDROCHLORIDE 100 MCG: 10 INJECTION INTRAVENOUS at 07:32

## 2022-06-29 RX ADMIN — ACETAMINOPHEN 650 MG: 325 TABLET ORAL at 17:04

## 2022-06-29 RX ADMIN — HYDROMORPHONE HYDROCHLORIDE 0.5 MG: 2 INJECTION, SOLUTION INTRAMUSCULAR; INTRAVENOUS; SUBCUTANEOUS at 10:40

## 2022-06-29 RX ADMIN — SODIUM CHLORIDE, SODIUM LACTATE, POTASSIUM CHLORIDE, AND CALCIUM CHLORIDE: 600; 310; 30; 20 INJECTION, SOLUTION INTRAVENOUS at 07:27

## 2022-06-29 RX ADMIN — Medication 2000 MG: at 07:40

## 2022-06-29 RX ADMIN — PROPOFOL 200 MCG/KG/MIN: 10 INJECTION, EMULSION INTRAVENOUS at 07:44

## 2022-06-29 RX ADMIN — GLYCOPYRROLATE 0.6 MG: 0.2 INJECTION INTRAMUSCULAR; INTRAVENOUS at 09:51

## 2022-06-29 RX ADMIN — Medication 4 MG: at 09:51

## 2022-06-29 RX ADMIN — HYDROMORPHONE HYDROCHLORIDE 1 MG: 2 INJECTION INTRAMUSCULAR; INTRAVENOUS; SUBCUTANEOUS at 10:18

## 2022-06-29 RX ADMIN — SODIUM CHLORIDE, PRESERVATIVE FREE 10 ML: 5 INJECTION INTRAVENOUS at 21:00

## 2022-06-29 RX ADMIN — LIDOCAINE HYDROCHLORIDE 1 MG/KG/HR: 4 INJECTION, SOLUTION INTRAVENOUS at 07:24

## 2022-06-29 RX ADMIN — PROPOFOL 50 MG: 10 INJECTION, EMULSION INTRAVENOUS at 07:34

## 2022-06-29 RX ADMIN — ROCURONIUM BROMIDE 10 MG: 50 INJECTION, SOLUTION INTRAVENOUS at 07:23

## 2022-06-29 RX ADMIN — ROCURONIUM BROMIDE 20 MG: 50 INJECTION, SOLUTION INTRAVENOUS at 08:43

## 2022-06-29 RX ADMIN — OXYCODONE 10 MG: 5 TABLET ORAL at 13:43

## 2022-06-29 RX ADMIN — CEFAZOLIN SODIUM 2000 MG: 100 INJECTION, POWDER, LYOPHILIZED, FOR SOLUTION INTRAVENOUS at 17:04

## 2022-06-29 RX ADMIN — MIDAZOLAM 2 MG: 1 INJECTION INTRAMUSCULAR; INTRAVENOUS at 07:18

## 2022-06-29 RX ADMIN — ACETAMINOPHEN 1000 MG: 500 TABLET, FILM COATED ORAL at 06:14

## 2022-06-29 ASSESSMENT — PAIN DESCRIPTION - DESCRIPTORS
DESCRIPTORS: ACHING
DESCRIPTORS: ACHING
DESCRIPTORS: DISCOMFORT
DESCRIPTORS: DISCOMFORT
DESCRIPTORS: SORE

## 2022-06-29 ASSESSMENT — PAIN DESCRIPTION - PAIN TYPE: TYPE: SURGICAL PAIN

## 2022-06-29 ASSESSMENT — PAIN DESCRIPTION - LOCATION
LOCATION: BACK

## 2022-06-29 ASSESSMENT — PAIN SCALES - GENERAL
PAINLEVEL_OUTOF10: 8
PAINLEVEL_OUTOF10: 2
PAINLEVEL_OUTOF10: 4
PAINLEVEL_OUTOF10: 8
PAINLEVEL_OUTOF10: 0
PAINLEVEL_OUTOF10: 4

## 2022-06-29 ASSESSMENT — PAIN - FUNCTIONAL ASSESSMENT
PAIN_FUNCTIONAL_ASSESSMENT: PREVENTS OR INTERFERES SOME ACTIVE ACTIVITIES AND ADLS
PAIN_FUNCTIONAL_ASSESSMENT: ACTIVITIES ARE NOT PREVENTED
PAIN_FUNCTIONAL_ASSESSMENT: 0-10
PAIN_FUNCTIONAL_ASSESSMENT: NONE - DENIES PAIN
PAIN_FUNCTIONAL_ASSESSMENT: ACTIVITIES ARE NOT PREVENTED
PAIN_FUNCTIONAL_ASSESSMENT: 0-10

## 2022-06-29 ASSESSMENT — PAIN DESCRIPTION - ORIENTATION
ORIENTATION: MID
ORIENTATION: LOWER

## 2022-06-29 ASSESSMENT — PAIN DESCRIPTION - FREQUENCY: FREQUENCY: CONTINUOUS

## 2022-06-29 ASSESSMENT — PAIN DESCRIPTION - ONSET: ONSET: ON-GOING

## 2022-06-29 NOTE — PROGRESS NOTES
PHYSICAL THERAPY Initial Assessment, Daily Note and PM  (Link to Caseload Tracking: PT Visit Days : 1  Acknowledge Orders  Time In/Out  PT Charge Capture  Rehab Caseload Tracker    SPINAL PRECAUTIONS    Chrystal Long is a 54 y.o. female   PRIMARY DIAGNOSIS: Spondylolisthesis at L4-L5 level  Lumbar stenosis with neurogenic claudication [M48.062]  Spondylolisthesis of lumbar region [M43.16]  Lumbar radiculopathy [M54.16]  Kyphosis of thoracolumbar region, unspecified kyphosis type [M40.205]  S/P lumbar fusion [Z98.1]  Procedure(s) (LRB):  L4-L5 direct lateral interbody fusion with interbody spacer/allograft (N/A)  L4-L5 laminectomy and fusion with allograft and instrumentation; right S1 hemilaminectomy (N/A)  Day of Surgery  Reason for Referral: Difficulty in walking, Not elsewhere classified (R26.2)  Low Back Pain (M54.5)  Inpatient: Payor: Omar Thrasher 150 / Plan: Buzz Meckel / Product Type: *No Product type* /     ASSESSMENT:     REHAB RECOMMENDATIONS:   Recommendation to date pending progress:  Setting:   Home Health Therapy    Equipment:     To Be Determined     ASSESSMENT:  Ms. Ping Mendes is a pleasant 54year old female s/p L4-L5 laminectomy and direct/lateral fusion with spinal precautions. Patient is seen this PM for initial PT evaluation post operatively. 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 Reports B LE pain and decreased community-level ambulation tolerance PTA. Today, patient presents with decreased functional BLE strength, activity tolerance, and balance/gait status. Required minimal assistance x2 for mobility this PM; see detailed assessment below. Anticipate patient will progress well toward stated goals. Recommend HHPT at discharge. Will follow with stated BID plan of care during acute stay.      [address] [facility] [address] [address] AM-PAC 6 Clicks Basic Mobility Inpatient Short Form  AM-PAC Mobility Inpatient   How much difficulty turning over in bed?: A Little  How much difficulty sitting down on / standing up from a chair with arms?: A Little  How much difficulty moving from lying on back to sitting on side of bed?: A Little  How much help from another person moving to and from a bed to a chair?: A Little  How much help from another person needed to walk in hospital room?: A Little  How much help from another person for climbing 3-5 steps with a railing?: A Lot  AM-PAC Inpatient Mobility Raw Score : 17  AM-PAC Inpatient T-Scale Score : 42.13  Mobility Inpatient CMS 0-100% Score: 50.57  Mobility Inpatient CMS G-Code Modifier : CK    SUBJECTIVE:   Ms. Latonia Weems states, \"My legs feel like jello. 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))    OBJECTIVE:     PAIN: VITALS / O2: PRECAUTION / Jose Luis Hamming / DRAINS:   Pre Treatment:   Pain Assessment: 0-10  Pain Level: 8  Pain Location: Back  Pain Orientation: Lower  Pain Descriptors: Sore  Non-Pharmaceutical Pain Intervention(s): Ambulation/Increased Activity; Emotional support;Repositioned; Rest (RN medicated prior to evaluation)    Post Treatment: 8/10, positioned to comfort, RN aware and addressing Vitals   Vitals  Heart Rate: 78  BP: 129/72  BP Location: Right upper arm  BP Method: Automatic  Patient Position: Sitting    Oxygen  O2 Therapy: Room air (RN okayed to leave on roomair)  Heart Rate: 78  SpO2: 95 %   IV    RESTRICTIONS/PRECAUTIONS:           Spinal Precautions: No Bending,No Lifting,No Twisting        GROSS EVALUATION: Intact Impaired (Comments):   AROM [x]  WFL B LE   PROM []    Strength []  B LE buckling, non focal (5/5 distal power)   Balance [] Posture: Good  Sitting - Static: Good  Sitting - Dynamic: Good  Standing - Static: Fair  Standing - Dynamic: Fair,-   Posture [] Forward Head  Trunk Flexion  (mild)   Sensation [x] Intact light touch L3-S1 B   Coordination [x] B LE   Tone [x] B LE   Edema []    Activity Tolerance []  Impaired secondary to post op and c/o pain.     []      COGNITION/  PERCEPTION: Intact Impaired (Comments):   Orientation [x]  Oriented x4   Vision []     Hearing [x]     Cognition  [x]       MOBILITY: I Mod I S SBA CGA Min Mod Max Total  NT x2 Comments:   Bed Mobility    Rolling [] [] [] [x] [] [] [] [] [] [] []    Supine to Sit [] [] [] [] [x] [] [] [] [] [] []    Scooting [] [] [] [x] [] [] [] [] [] [] []    Sit to Supine [] [] [] [] [] [] [] [] [] [x] []    Transfers    Sit to Stand [] [] [] [] [] [x] [] [] [] [] [x]    Bed to Chair [] [] [] [] [] [x] [] [] [] [] [x]    Stand to Sit [] [] [] [] [] [x] [] [] [] [] [x]     [] [] [] [] [] [] [] [] [] [] []    I=Independent, Mod I=Modified Independent, S=Supervision, SBA=Standby Assistance, CGA=Contact Guard Assistance,   Min=Minimal Assistance, Mod=Moderate Assistance, Max=Maximal Assistance, Total=Total Assistance, NT=Not Tested    GAIT: I Mod I S SBA CGA Min Mod Max Total  NT x2 Comments:   Level of Assistance [] [] [] [] [] [x] [] [] [] [] [x]    Distance 3x5ft feet    DME bilateral handheld    Gait Quality Decreased gurpreet , Decreased step clearance, Decreased step length and functional B LE weakness    Weightbearing Status      Stairs      I=Independent, Mod I=Modified Independent, S=Supervision, SBA=Standby Assistance, CGA=Contact Guard Assistance,   Min=Minimal Assistance, Mod=Moderate Assistance, Max=Maximal Assistance, Total=Total Assistance, NT=Not Tested    PLAN:   ACUTE PHYSICAL THERAPY GOALS:   (Developed with and agreed upon by patient and/or caregiver.)  1. Patient will perform bed mobility with INDEPENDENCE within 7 days. 2. Patient will transfer bed to chair with INDEPENDENCE within 7 days.   3. Patient will demonstrate FAIR+ DYNAMIC STANDING balance within 7 day(s). 4. Patient will ambulate 300ft+ with INDEPENDENCE within 7 days. 5. Patient will tolerate 25+ minutes of therapeutic activity/exercise and/or neuromuscular re-education while maintaining stable vitals to improve functional strength and activity tolerance within 7 days. FREQUENCY AND DURATION: BID for duration of hospital stay or until stated goals are met, whichever comes first.    THERAPY PROGNOSIS: Good    PROBLEM LIST:   (Skilled intervention is medically necessary to address:)  Decreased ADL/Functional Activities  Decreased Activity Tolerance  Decreased Balance  Decreased Gait Ability  Decreased Safety Awareness  Decreased Strength  Decreased Transfer Abilities  Increased Pain INTERVENTIONS PLANNED:   (Benefits and precautions of physical therapy have been discussed with the patient.)  Therapeutic Activity  Therapeutic Exercise/HEP  Neuromuscular Re-education  Gait Training  Education       TREATMENT:   EVALUATION: LOW COMPLEXITY: (Untimed Charge)  At this time, patient is appropriate for Co-treatment with occupational therapy due to patient's decreased overall endurance/tolerance levels, as well as need for high level skilled assistance to complete functional transfers/mobility and functional tasks. Artemio Adams is appropriate for a multidisciplinary co-treatment of PT and OT to address goals of both disciplines. TREATMENT:   Gait Training (23 Minutes): Gait training for 3x5 feet utilizing bilateral handheld. Patient required Tactile, Verbal and Visual cueing to improve Activity Pacing, Dynamic Standing Balance, Gait Mechanics and pre-gait sit to stand transfer training, weight shifts, and stance.      TREATMENT GRID:  N/A    AFTER TREATMENT PRECAUTIONS: Bed/Chair Locked, Call light within reach, Chair, Needs within reach, RN notified, Visitors at bedside and Educated patient to call for assistance out of chair, verbalized understanding, notified RN of patient status/progress, RN okayed patient on room air    INTERDISCIPLINARY COLLABORATION:  RN/ PCT, PT/ PTA and OT/ KAUFFMAN    EDUCATION: Education Given To: Patient; Family  Education Provided: Role of Therapy;Plan of Care;Precautions; Fall Prevention Strategies;Transfer Training  Education Method: Demonstration;Verbal  Education Outcome: Verbalized understanding;Demonstrated understanding    TIME IN/OUT:  Time In: 1340  Time Out: Rebecca 141  Minutes: Basilio Kumar PT

## 2022-06-29 NOTE — PROGRESS NOTES
OCCUPATIONAL THERAPY Initial Assessment and Daily Note       OT Visit Days: 1  Acknowledge Orders  Time  OT Charge Capture  Rehab Caseload Tracker      Zaida Pacheco is a 54 y.o. female   PRIMARY DIAGNOSIS: Spondylolisthesis at L4-L5 level  Lumbar stenosis with neurogenic claudication [M48.062]  Spondylolisthesis of lumbar region [M43.16]  Lumbar radiculopathy [M54.16]  Kyphosis of thoracolumbar region, unspecified kyphosis type [M40.205]  S/P lumbar fusion [Z98.1]  Procedure(s) (LRB):  L4-L5 direct lateral interbody fusion with interbody spacer/allograft (N/A)  L4-L5 laminectomy and fusion with allograft and instrumentation; right S1 hemilaminectomy (N/A)  Day of Surgery  Reason for Referral: Generalized Muscle Weakness (M62.81)  Other lack of cordination (R27.8)  Difficulty in walking, Not elsewhere classified (R26.2)  Inpatient: Payor: Saintclair Abu / Plan: Janessa Search / Product Type: *No Product type* /     ASSESSMENT:     REHAB RECOMMENDATIONS:   Recommendation to date pending progress:  Setting:   Home Health Therapy    Equipment:     To Be Determined     ASSESSMENT:  Ms. Siomara Woodward presented to the hospital for elective spine procedure listed above. At baseline, pt is independent for all ADLs and IADLs. 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 George MASON. Today, pt was received supine in bed. Completed bed mobility with Sonia. CGA for LB dressing. Sit>stand Sonia x2. Began to ambulate to toilet Sonia x2 but became very unsteady, pale, and lightheaded. Transferred to [facility] instead for safety. Vitals normal. Performed toileting with Sonia. SPT to recliner Sonia x2. Pt emotional regarding her legs feeling shaky. Educated on spinal precautions and implications on ADLs/mobility. Anticipate pt is going to progress well and be able to d[facility].  Stella Adams currently demonstrates overall deficits in strength, balance, activity tolerance, and ADL Activity Tolerance []  rest breaks required      Hand Dominance R [] L []      COGNITION/  PERCEPTION: INTACT IMPAIRED   (See Comments)   Orientation [x]     Vision [x]     Hearing [x]     Cognition  [x]     Perception [x]       MOBILITY: I Mod I S SBA CGA Min Mod Max Total  NT x2 Comments:   Bed Mobility    Rolling [] [] [] [] [] [x] [] [] [] [] []    Supine to Sit [] [] [] [] [] [x] [] [] [] [] [] Via log roll    Scooting [] [] [] [] [] [] [] [] [] [x] []    Sit to Supine [] [] [] [] [] [] [] [] [] [x] [] Left sitting in the chair    Transfers    Sit to Stand [] [] [] [] [] [x] [] [] [] [] [x]    Bed to Chair [] [] [] [] [] [x] [] [] [] [] [x]    Stand to Sit [] [] [] [] [] [x] [] [] [] [] []    Tub/Shower [] [] [] [] [] [] [] [] [] [x] []     Toilet [] [] [] [] [] [x] [] [] [] [] [x] BSC    [] [] [] [] [] [] [] [] [] [] []    I=Independent, Mod I=Modified Independent, S=Supervision/Setup, SBA=Standby Assistance, CGA=Contact Guard Assistance, Min=Minimal Assistance, Mod=Moderate Assistance, Max=Maximal Assistance, Total=Total Assistance, NT=Not Tested    ACTIVITIES OF DAILY LIVING: I Mod I S SBA CGA Min Mod Max Total NT Comments   BASIC ADLs:              Upper Body Bathing  [] [] [] [] [] [] [] [] [] [x]    Lower Body Bathing [] [] [] [] [] [] [] [] [] [x]    Toileting [] [] [] [] [] [x] [] [] [] [] BSC, mari-care in standing    Upper Body Dressing [] [] [] [] [] [] [] [] [] [x]    Lower Body Dressing [] [] [] [] [x] [] [] [] [] [] Socks sitting EOB   Feeding [] [] [] [] [] [] [] [] [] [x]    Grooming [] [] [] [] [] [] [] [] [] [x]    Personal Device Care [] [] [] [] [] [] [] [] [] [x]    Functional Mobility [] [] [] [] [] [x] [] [] [] [] x2   I=Independent, Mod I=Modified Independent, S=Supervision/Setup, SBA=Standby Assistance, CGA=Contact Guard Assistance, Min=Minimal Assistance, Mod=Moderate Assistance, Max=Maximal Assistance, Total=Total Assistance, NT=Not Tested    PLAN:     FREQUENCY/DURATION   OT Plan of Care: 5 times/week for duration of hospital stay or until stated goals are met, whichever comes first.    ACUTE OCCUPATIONAL THERAPY GOALS:   (Developed with and agreed upon by patient and/or caregiver.)  1. Patient will complete lower body bathing and dressing with MOD I and adaptive equipment as needed. 2.Patient will complete upper body bathing and dressing with MOD I and adaptive equipment as needed. 3. Patient will complete toileting with MOD I.   4. Patient will tolerate 25 minutes of OT treatment with 1-2 rest breaks to increase activity tolerance for ADLs. 5. Patient will complete functional transfers with MOD I and adaptive equipment as needed. 6. Patient will complete functional activity with MOD I and adaptive equipment as needed. Timeframe: 7 visits     PROBLEM LIST:   (Skilled intervention is medically necessary to address:)  Decreased ADL/Functional Activities  Decreased Activity Tolerance  Decreased Balance  Decreased Coordination  Decreased Safety Awareness  Decreased Strength  Decreased Transfer Abilities   INTERVENTIONS PLANNED:  (Benefits and precautions of occupational therapy have been discussed with the patient.)  Self Care Training  Therapeutic Activity  Therapeutic Exercise/HEP  Neuromuscular Re-education  Manual Therapy  Education         TREATMENT:     EVALUATION: LOW COMPLEXITY: (Untimed Charge)    TREATMENT:   Co-Treatment PT/OT necessary due to patient's decreased overall endurance/tolerance levels, as well as need for high level skilled assistance to complete functional transfers/mobility and functional tasks  Therapeutic Activity (10 Minutes): Therapeutic activity included Rolling, Supine to Sit, Transfer Training, Ambulation on level ground, Sitting balance  and Standing balance to improve functional Activity tolerance, Balance, Coordination, Mobility and Strength.   Self Care (10 minutes): Patient participated in toileting and lower body dressing ADLs in unsupported sitting and standing with minimal verbal cueing to increase independence, decrease assistance required, increase activity tolerance and increase safety awareness. Patient also participated in functional mobility, functional transfer and energy conservation training to increase independence, decrease assistance required, increase activity tolerance and increase safety awareness. TREATMENT GRID:  N/A    AFTER TREATMENT PRECAUTIONS: Call light within reach, Chair, Needs within reach, RN notified and Visitors at bedside    INTERDISCIPLINARY COLLABORATION:  RN/ PCT, PT/ PTA and OT/ KAUFFMAN    EDUCATION:  Education Given To: Patient; Family  Education Provided: Role of Therapy;Plan of Care;Precautions; Fall Prevention Strategies  Education Outcome: Verbalized understanding;Demonstrated understanding    TOTAL TREATMENT DURATION AND TIME:  Time In: 1340  Time Out: [place]  Minutes: [facility], OT

## 2022-06-29 NOTE — OP NOTE
Implanted   SCREW SPNL L20MM OD65MM POLYAX LUPE CT [facility] - [id number]  SCREW SPNL L20MM OD65MM POLYAX LUPE CT [facility]- [id number] N/A 2 Implanted       INDICATIONS FOR PROCEDURE: Back and leg pain consistent with claudication/lumbar radiculitis that is no longer responsive to conservative measures. Walking and standing tolerances have diminished. Imaging studies are concordant, showing lumbar stenosis. In the outpatient setting, the risks, benefits, and potential complications of the above listed procedure were discussed and an informed consent was obtained. DESCRIPTION OF PROCEDURE:     Lateral flank incision:    After adequate induction of general anesthesia, the patient was placed in the right lateral decubitus position with an axillary roll and the left side up. Care was taken to pad all bony prominences. The patient was secured to the bed with several applications of tape. Preoperative antibiotics were administered. A time out was called after the patient's flank was prepped and draped in the usual sterile fashion. At this point, C-arm fluoroscopy was brought in and used to identify externally the location of the appropriate disk space. A transverse incision was created directly over disc space on the flank. The fascial plane was entered using Metzenbaum scissors and digital palpation was performed to palpate the transverse processes. The finger was then rolled up over the iliopsoas, which was palpated as well. Then, through the direct lateral incision, the monitoring probe was inserted through the fascial plane and directed to the iliopsoas surface. At this point, the monitoring and EMG equipment was applied and monitored during dissection through the iliopsoas muscle. The dilator probe was advanced through the iliopsoas and directed toward the central 1/3 of the L4 - L5  interspace and docked laterally directly over the annulus fibrosis. The probe was inserted directly into the disc space. Then, a guidewire was inserted through the cannulated probe. This was confirmed fluoroscopically in AP and lateral planes and was felt to be satisfactory. At this point, the dissection was sequentially dilated and finally the minimally invasive retractor was inserted over the final dilator. Once the retractor was secured, the dilators were removed and the retractor was secured with the locking pin into the vertebral body. The guidepin was then removed. Light sources were applied and the area was checked with the monitoring probe. The annulotomy knife was used to perform an annulotomy of the  L4 - L5 disk space. A complete diskectomy was performed using a series of pituitary rongeurs, rasps and curets. A Jay elevator was inserted and impacted across the contralateral annulus fibrosis. This was done under fluoroscopic visualization. Once all disk material was removed and the implants had been prepared, the sizing paddles were inserted and increased in size until there was a good fit. The trial implant was inserted and visualized fluoroscopically in both AP and lateral planes and was felt to be satisfactory. The interbody cage device was then selected. Allograft demineralized bone matrix was prepared on the back table and inserted into the cage. The interbody cage was then impacted under fluoroscopic visualization to be in the central portion of the disk space and inserted across both endplates on the coronal view. This was felt to be satisfactory with radiographic imaging. With this, the minimally invasive retractor was removed. Final fluoroscopic images were obtained in both planes and felt to be satisfactory. The superficial wound was liberally irrigated and the wound was approximated with a 2-0 and a 3-0 Vicryl suture in a layered fashion. Dermabond was applied. Posterior incision:    The patient was then reopositioned prone on the [facility] spinal table. Care was taken to pad all bony prominences. The shoulders and elbows were placed in the 90/90 position. The abdomen was allowed to hang free to decrease intraabdominal and venous pressure. The lumbar area was prepped and draped in the usual sterile fashion. A second time out was called to confirm the appropriate patient, proposed procedure and proposed incision sites. With this conformation, an incision was created midline, over the lumbar spinous processes. Dissection was carried down to the lumbodorsal fascia. The lumbodorsal fascia and paraspinous musculature were elevated in a subperiosteal fashion, laterally off of the spinous processes and lamina. A curet was slipped beneath the lamina and a cross table fluoroscopic image was obtained to identify the appropriate spinal level. The pars interarticularis was exposed at each level. Care was taken to preserve the facet capsule at each level. The deep retractors were placed to facilitate visualization. A Leksell rongeur was used to resect the spinous processes of  L4 - L5. The 4 mm kathryn was used to thin the lamina to an eggshell like thickness. A triple-0 angled curet was used to elevated the ligamentum flavum off of its origin on the caudal surface of the L4 lamina as well as off the cephalad surface of the adjacent lamina. The ligamentum was elevated from the thecal sac and a plane was created in the epidural space with the [facility]. A 4 mm Kerrison was used to perform a laminectomy of  L4 - L5 . The central laminectomy was widened to the medial border of the pedicle at each level. With the central laminectomy completed, a 4 mm Kerrison was used to under cut the lateral recess along the entire length of the laminectomy site. Then using the Cleopatra Bers elevator to retract the thecal sac and identify each of the nerve roots, partial foraminotomies were performed and each nerve was visualized and decompressed bilaterally at L4 and L5.       The 4 mm kathryn was used to decorticate the previously exposed transverse processes and lateral aspect of the facet joints and pars intra-articularis. The [employer]Droppy) spinal instrumentation system was brought to the field and using a free hand intersection technique, cortical screws were placed bilaterally from L4 to L5. The medial border of the pedicle was visualized through the spinal canal to confirm no medial or inferior breech. This was felt to be satisfactory. At this point the Protios soaked allograft was then packed into the lateral gutters beneath the screw heads, along the decorticated transverse processes and lateral facet joints for the posterolateral arthrodesis. Appropriately sized rods were then selected and bent into additional lordosis and laid into the pedicle screw heads from  L4 to L5. The set screws were then applied and tightened to the appropriate torque. C-arm fluoroscopy was brought in and used to obtain images to confirm appropriate hardware level and placement. This was felt to be satisfactory. The lumbodorsal fascia was released on the right  side of S1 and dissection was carried down to the lamina and pars interarticularis. The operating microscope was brought to the surgical field. The ligamentum flavum was then elevated off of its insertion on the   S1  lamina. A 4 mm Kerrison was used to perform a hemilaminectomy of S1. The ligamentum flavum was carefully elevated off of the thecal sac and excised with the Kerrison rongeur. The descending nerve root was identified and retracted medially. The lateral recess was undercut laterally to the pedicle. A foraminotomy was performed to decompress the exiting nerve root. The nerve root was retracted medially again with the Kanu nerve root retractor. The underlying disk was identified and inspected. The nerve was released from retraction and the area inspected and palpated with a nerve hook for adequacy of decompression. This was satisfactory.        With this, the wound was liberally irrigated and a hemovac drain was inserted through a separate incision in a subfascial plane. The lumbodorsal fascia was approximated with a # 1 Vicryl suture in an interrupted fashion. The subcutaneous tissue and skin were approximated in a layered fashion. Dermabond was applied. Sterile dressings were applied. The patient tolerated the procedure well and was returned to the postanesthesia care unit in stable condition. At the end of the case, all sponge, needle, and instrument counts were correct.      Kian Clark MD

## 2022-06-29 NOTE — H&P
Name: Bao Chen   YOB: 1970   Gender: female   MRN: [id number]   Age: 54 y.o. Chief Complaint:  Radiating back and leg pain      History of Present Illness: This is a patient who has been followed by Glenny Diane and has a 4 or 5-year history of back pain and limited tolerance for walking and standing. The majority of her pain is in her low back and it does radiate to the left buttock and leg. She has numbness  and tingling in the leg to the posterior lateral thigh and distal to the knee as well. Sitting down seems to be the alleviating factor. She has tried regimens of NSAIDs and home exercises. She was also referred to physical therapy and has been doing  that for the last 4 weeks. She has had an epidural injection as recent as March 24. Ultimately, her symptoms continue to progress and she is frustrated with her situation. She would like to consider surgical options. Medications:          Current Outpatient Medications:      nabumetone (RELAFEN) 500 mg tablet, Take 1 Tablet by mouth two (2) times daily as needed for Pain., Disp: 60 Tablet, Rfl: 3     hydroCHLOROthiazide (HYDRODIURIL) 25 mg tablet, Take 1 Tablet by mouth daily. , Disp: 90 Tablet, Rfl: 3     amLODIPine (NORVASC) 2.5 mg tablet, Take 1 Tablet by mouth daily. , Disp: 30 Tablet, Rfl: 2     therapeutic multivitamin (THERAGRAN) tablet, Take 1 Tablet by mouth daily. , Disp: , Rfl:      omeprazole (PRILOSEC) 40 mg capsule, Take 40 mg by mouth daily. , Disp: , Rfl:       Allergies:   No Known Allergies         Physical Exam:       This is a well developed well nourished female  adult. Mood and affect are appropriate. Oriented to person, place, and time. Chest is clear to auscultation. Heart is regular rate and rhythm. The patient ambulates with an antalgic and crouched gait.        Sensory testing reveals diminished light touch sensation in the  left L5 dermatome including the discussed surgical options including a combined direct lateral fusion and posterior fusion. We discussed the details of the the surgery including a small lateral incision over  the flank followed by dissection to the area of disc collapse and deformity. This would be done using neural monitoring and a series of minimally invasive retractors. Once identifying the disk space radiographically, the retractors would be docked and  the disk would be excised. The disk space would be distracted as much as possible and measured for the appropriate sized peak cage. This would be packed with allograft and likely bone marrow aspirate. Lateral screws may be applied for supplemental  stability. The wound would then be closed with suture and covered with sterile dressings. She would expect to stay in the hospital 1-2 days  or until she can get about safely with minimal assistance. A short stay in a rehabilitation facility could also be considered depending on how  quickly she recovers. Follow-up would be scheduled for 2 weeks and there will be restrictions including no driving, and no lifting greater than  15 lbs until follow up with me. She was encouraged to walk as much as possible before and after the operation to facilitate an expeditious recovery.    We also discussed the potential risks of the surgery including, but not limited to infection, spinal fluid leak and potential headaches requiring them to remain supine or have a lumbar drain inserted post-operatively; injury to the cauda equina or peripheral  nerve root resulting in paralysis, bowel or bladder injury or dysfunction, or loss of use of an extremity; persistent back or leg symptoms or pain at the bone graft site; recurrence of stenosis or the development of instability, or failure of the hardware  or fusion to heal possibly needing additional surgery;  blood loss requiring transfusion; and the risks of anesthesia including, but not limited heart attack, stroke, and blood clot, and death. Also there is the risk of visceral, vascular, renal, or  other intra-abdominal injury. She also understands that patients likely have groin pain and anterior thigh pain due to dissection through the  iliopsoas. The patient voiced an understanding of these issues and would like to proceed with surgical scheduling. The procedure we will plan for is a direct lateral fusion at L4-L5 with interbody cage, allograft, and posterior laminectomy and fusion  with instrumentation at L4 L5 and a right S1 hemilaminectomy.

## 2022-06-29 NOTE — ANESTHESIA POSTPROCEDURE EVALUATION
Department of Anesthesiology  Postprocedure Note    Patient: Gómez Lee  MRN: [id number]  YOB: 1970  Date of evaluation: 6/29/2022      Procedure Summary     Date: 06/29/22 Room / Location: [facility] MAIN OR  / [facility] MAIN OR    Anesthesia Start: 0718 Anesthesia Stop: 1025    Procedures:       L4-L5 direct lateral interbody fusion with interbody spacer/allograft (N/A Flank)      L4-L5 laminectomy and fusion with allograft and instrumentation; right S1 hemilaminectomy (N/A Spine Lumbar) Diagnosis:       Lumbar stenosis with neurogenic claudication      Spondylolisthesis of lumbar region      Lumbar radiculopathy      Kyphosis of thoracolumbar region, unspecified kyphosis type      (Lumbar stenosis with neurogenic claudication [M48.062])      (Spondylolisthesis of lumbar region [M43.16])      (Lumbar radiculopathy [M54.16])      (Kyphosis of thoracolumbar region, unspecified kyphosis type [M40.205])    Providers: Ibeth Mcclure MD Responsible Provider: Diana Traylor MD    Anesthesia Type: General ASA Status: 2          Anesthesia Type: General    Farhana Phase I: Farhana Score: 9    Farhana Phase II:        Anesthesia Post Evaluation    Patient location during evaluation: PACU  Patient participation: complete - patient participated  Level of consciousness: awake  Airway patency: patent  Nausea & Vomiting: no nausea  Complications: no  Cardiovascular status: hemodynamically stable  Respiratory status: acceptable and nonlabored ventilation  Hydration status: stable  Multimodal analgesia pain management approach

## 2022-06-29 NOTE — PROGRESS NOTES
TRANSFER - IN REPORT:    Verbal report received from PACU on Nakia Adams  being received from PACU for routine post-op      Report consisted of patient's Situation, Background, Assessment and   Recommendations(SBAR). Information from the following report(s) Surgery Report, Intake/Output, MAR, Recent Results and Med Rec Status was reviewed with the receiving nurse. Opportunity for questions and clarification was provided. Assessment completed upon patient's arrival to unit and care assumed.

## 2022-06-29 NOTE — PERIOP NOTE
Pt hemovac pulled out while pt moving to side. Called Dr. Gilliland Seek to report finding. MD aware.

## 2022-06-29 NOTE — ANESTHESIA PRE PROCEDURE
Department of Anesthesiology  Preprocedure Note       Name:  Jocelin Gomez   Age:  54 y.o.  :  1970                                          MRN:  [id number]         Date:  2022      Surgeon: Flor Villagran):  Сергей Da Silva MD    Procedure: Procedure(s):  L4-L5 direct lateral interbody fusion with interbody spacer/allograft  L4-L5 laminectomy and fusion with allograft and instrumentation; right S1 hemilaminectomy    Medications prior to admission:   Prior to Admission medications    Medication Sig Start Date End Date Taking?  Authorizing Provider   hydroCHLOROthiazide (HYDRODIURIL) 25 MG tablet Take 25 mg by mouth daily 22   Ar Automatic Reconciliation   nabumetone (RELAFEN) 500 MG tablet Take 500 mg by mouth daily  22   Ar Automatic Reconciliation   omeprazole (PRILOSEC) 40 MG delayed release capsule Take 40 mg by mouth daily    Ar Automatic Reconciliation       Current medications:    Current Facility-Administered Medications   Medication Dose Route Frequency Provider Last Rate Last Admin    lidocaine 1 % injection 1 mL  1 mL IntraDERmal Once PRN Justo Pickering MD        fentaNYL (SUBLIMAZE) injection 100 mcg  100 mcg IntraVENous Once PRN Justo Pickering MD        lactated ringers infusion   IntraVENous Continuous Justo Pickering  mL/hr at 22 0614 New Bag at 22 0614    sodium chloride flush 0.9 % injection 5-40 mL  5-40 mL IntraVENous 2 times per day Justo Pickering MD        sodium chloride flush 0.9 % injection 5-40 mL  5-40 mL IntraVENous PRN Justo Pickering MD        0.9 % sodium chloride infusion   IntraVENous PRN Justo Pickering MD        midazolam PF (VERSED) injection 2 mg  2 mg IntraVENous Once PRN Justo Pickering MD        ceFAZolin (ANCEF) 2000 mg in sterile water 20 mL IV syringe  2,000 mg IntraVENous On Call to Kimberly Mayen MD           Allergies:  No Known Allergies    Problem List:    Patient Active Problem List   Diagnosis Code    Spondylolisthesis at L4-L5 level M43.16    Spinal stenosis of lumbar region with neurogenic claudication M48.062    Obesity (BMI 30-39. 9) E66.9    Essential hypertension I10    Lumbar radiculopathy M54.16    Kyphosis of thoracolumbar region M40.205       Past Medical History:        Diagnosis Date    GERD (gastroesophageal reflux disease)     Hypertension     managed w/ med       Past Surgical History:        Procedure Laterality Date    CHOLECYSTECTOMY      gastric by pass    GASTRIC BYPASS SURGERY      GYN      tubal    TUBAL LIGATION  2002    WISDOM TOOTH EXTRACTION Bilateral        Social History:    Social History     Tobacco Use    Smoking status: Former Smoker     Packs/day: 1.00     Quit date:      Years since quittin.4    Smokeless tobacco: Never Used    Tobacco comment: smoked for 10 years total   Substance Use Topics    Alcohol use: Yes     Comment: socially                                Counseling given: Not Answered  Comment: smoked for 10 years total      Vital Signs (Current):   Vitals:    22 0612   BP: (!) 152/77   Pulse: 87   Resp: 16   Temp: 98.7 °F (37.1 °C)   TempSrc: Oral   SpO2: 96%   Weight: 223 lb 3.2 oz (101.2 kg)   Height: 5' 7\" (1.702 m)                                              BP Readings from Last 3 Encounters:   22 (!) 152/77   22 135/77   22 131/89       NPO Status: Time of last liquid consumption:                         Time of last solid consumption:                         Date of last liquid consumption: 22                        Date of last solid food consumption: 22    BMI:   Wt Readings from Last 3 Encounters:   22 223 lb 3.2 oz (101.2 kg)   22 224 lb 1.6 oz (101.7 kg)   22 219 lb 4 oz (99.5 kg)     Body mass index is 34.96 kg/m².     CBC:   Lab Results   Component Value Date    WBC 8.6 2022    RBC 4.37 2022    HGB 11.7 2022    HCT 36.0 2022    MCV 82.4 2022 RDW 14.6 06/21/2022     06/21/2022       CMP:   Lab Results   Component Value Date     06/21/2022    K 3.6 06/21/2022     06/21/2022    CO2 32 06/21/2022    BUN 12 06/21/2022    CREATININE 0.52 06/21/2022    GFRAA >60 06/21/2022    AGRATIO 1.4 04/01/2022    LABGLOM >60 06/21/2022    GLUCOSE 104 06/21/2022    PROT 7.0 04/01/2022    CALCIUM 9.4 06/21/2022    BILITOT 0.6 04/01/2022    ALKPHOS 63 04/01/2022    AST 22 04/01/2022    ALT 17 04/01/2022       POC Tests:   Recent Labs     06/29/22  0556   POCK 3.5       Coags: No results found for: PROTIME, INR, APTT    HCG (If Applicable): No results found for: PREGTESTUR, PREGSERUM, HCG, HCGQUANT     ABGs: No results found for: PHART, PO2ART, CBH0AVZ, SQH9SZX, BEART, Y7CVSSTJ     Type & Screen (If Applicable):  No results found for: LABABO, LABRH    Drug/Infectious Status (If Applicable):  No results found for: HIV, HEPCAB    COVID-19 Screening (If Applicable): No results found for: COVID19        Anesthesia Evaluation  Patient summary reviewed and Nursing notes reviewed no history of anesthetic complications:   Airway: Mallampati: II  TM distance: >3 FB   Neck ROM: full  Mouth opening: > = 3 FB   Dental: normal exam         Pulmonary:Negative Pulmonary ROS breath sounds clear to auscultation                             Cardiovascular:  Exercise tolerance: good (>4 METS),   (+) hypertension: mild,          Beta Blocker:  Not on Beta Blocker         Neuro/Psych:                ROS comment: Back pain with radiculopathy GI/Hepatic/Renal:   (+) GERD: well controlled,          ROS comment: obesity. Endo/Other: Negative Endo/Other ROS                    Abdominal:             Vascular: negative vascular ROS. Other Findings:           Anesthesia Plan      general     ASA 2       Induction: intravenous. continuous noninvasive hemodynamic monitor    Anesthetic plan and risks discussed with patient and mother.                         Dari Ovalles MD 6/29/2022

## 2022-06-29 NOTE — ANESTHESIA PROCEDURE NOTES
Airway  Date/Time: 6/29/2022 7:28 AM  Urgency: elective    Airway not difficult    General Information and Staff    Patient location during procedure: OR    Indications and Patient Condition  Indications for airway management: anesthesia  Preoxygenated: yes  Mask difficulty assessment: vent by bag mask    Final Airway Details  Final airway type: endotracheal airway      Successful airway: ETT    Successful intubation technique: direct laryngoscopy  Endotracheal tube insertion site: oral  Blade: Alycia  Blade size: #4  ETT size (mm): 7.0  Cormack-Lehane Classification: grade I - full view of glottis  Placement verified by: chest auscultation and capnometry   Measured from: lips  ETT to lips (cm): 21  Number of attempts at approach: 1  Ventilation between attempts: bag mask  Number of other approaches attempted: 0    no

## 2022-06-30 ENCOUNTER — HOME HEALTH ADMISSION (OUTPATIENT)
Dept: HOME HEALTH SERVICES | Facility: HOME HEALTH | Age: 52
End: 2022-06-30
Payer: COMMERCIAL

## 2022-06-30 VITALS
OXYGEN SATURATION: 99 % | RESPIRATION RATE: 20 BRPM | WEIGHT: 223.2 LBS | TEMPERATURE: 98.4 F | HEART RATE: 82 BPM | BODY MASS INDEX: 35.03 KG/M2 | SYSTOLIC BLOOD PRESSURE: 102 MMHG | DIASTOLIC BLOOD PRESSURE: 70 MMHG | HEIGHT: 67 IN

## 2022-06-30 PROBLEM — M54.16 LUMBAR RADICULOPATHY: Status: RESOLVED | Noted: 2022-05-31 | Resolved: 2022-06-30

## 2022-06-30 PROBLEM — M43.16 SPONDYLOLISTHESIS AT L4-L5 LEVEL: Status: RESOLVED | Noted: 2022-04-07 | Resolved: 2022-06-30

## 2022-06-30 PROBLEM — M48.062 SPINAL STENOSIS OF LUMBAR REGION WITH NEUROGENIC CLAUDICATION: Status: RESOLVED | Noted: 2022-04-07 | Resolved: 2022-06-30

## 2022-06-30 PROCEDURE — 97535 SELF CARE MNGMENT TRAINING: CPT

## 2022-06-30 PROCEDURE — 6360000002 HC RX W HCPCS: Performed by: ORTHOPAEDIC SURGERY

## 2022-06-30 PROCEDURE — 97116 GAIT TRAINING THERAPY: CPT

## 2022-06-30 PROCEDURE — 97530 THERAPEUTIC ACTIVITIES: CPT

## 2022-06-30 PROCEDURE — 2500000003 HC RX 250 WO HCPCS: Performed by: ORTHOPAEDIC SURGERY

## 2022-06-30 PROCEDURE — 6370000000 HC RX 637 (ALT 250 FOR IP): Performed by: ORTHOPAEDIC SURGERY

## 2022-06-30 PROCEDURE — 2580000003 HC RX 258: Performed by: ORTHOPAEDIC SURGERY

## 2022-06-30 RX ADMIN — SODIUM CHLORIDE, PRESERVATIVE FREE 10 ML: 5 INJECTION INTRAVENOUS at 08:30

## 2022-06-30 RX ADMIN — ACETAMINOPHEN 650 MG: 325 TABLET ORAL at 00:11

## 2022-06-30 RX ADMIN — CEFAZOLIN SODIUM 2000 MG: 100 INJECTION, POWDER, LYOPHILIZED, FOR SOLUTION INTRAVENOUS at 00:11

## 2022-06-30 RX ADMIN — ACETAMINOPHEN 650 MG: 325 TABLET ORAL at 05:43

## 2022-06-30 RX ADMIN — OXYCODONE 10 MG: 5 TABLET ORAL at 05:43

## 2022-06-30 RX ADMIN — OXYCODONE 10 MG: 5 TABLET ORAL at 00:11

## 2022-06-30 RX ADMIN — OXYCODONE 10 MG: 5 TABLET ORAL at 09:32

## 2022-06-30 ASSESSMENT — PAIN SCALES - GENERAL
PAINLEVEL_OUTOF10: 7
PAINLEVEL_OUTOF10: 3
PAINLEVEL_OUTOF10: 0
PAINLEVEL_OUTOF10: 7
PAINLEVEL_OUTOF10: 10

## 2022-06-30 ASSESSMENT — PAIN DESCRIPTION - FREQUENCY: FREQUENCY: CONTINUOUS

## 2022-06-30 ASSESSMENT — PAIN DESCRIPTION - PAIN TYPE
TYPE: SURGICAL PAIN
TYPE: ACUTE PAIN

## 2022-06-30 ASSESSMENT — PAIN DESCRIPTION - LOCATION
LOCATION: BACK
LOCATION: BACK

## 2022-06-30 ASSESSMENT — PAIN DESCRIPTION - ONSET: ONSET: ON-GOING

## 2022-06-30 ASSESSMENT — PAIN DESCRIPTION - DESCRIPTORS
DESCRIPTORS: ACHING
DESCRIPTORS: ACHING

## 2022-06-30 ASSESSMENT — PAIN DESCRIPTION - ORIENTATION
ORIENTATION: LOWER
ORIENTATION: LOWER

## 2022-06-30 NOTE — PROGRESS NOTES
OCCUPATIONAL THERAPY Daily Note     OT Visit Days: 2   Time  OT Charge Capture  Rehab Caseload Tracker  OT Orders    Paco Hamilton is a 54 y.o. female   PRIMARY DIAGNOSIS: Spondylolisthesis at L4-L5 level  Lumbar stenosis with neurogenic claudication [M48.062]  Spondylolisthesis of lumbar region [M43.16]  Lumbar radiculopathy [M54.16]  Kyphosis of thoracolumbar region, unspecified kyphosis type [M40.205]  S/P lumbar fusion [Z98.1]  Procedure(s) (LRB):  L4-L5 direct lateral interbody fusion with interbody spacer/allograft (N/A)  L4-L5 laminectomy and fusion with allograft and instrumentation; right S1 hemilaminectomy (N/A)  1 Day Post-Op  Inpatient: Payor: Omar Thrasher 150 / Plan: Kathryn Quiles / Product Type: *No Product type* /     ASSESSMENT:     REHAB RECOMMENDATIONS: CURRENT LEVEL OF FUNCTION:  (Most Recently Demonstrated)   Recommendation to date pending progress:  [address] [place] [address] Therapy    Equipment:     To Be Determined Bathing:   Supervision/Setup  Dressing:   Supervision/Setup  Feeding/Grooming:   Supervision/Setup  Toileting:   Supervision/Setup  Functional Mobility:   Stand by Assist     ASSESSMENT:  Ms. Shivani Lewis was going to the bathroom upon arrival. Pt completed sponge bath while standing at sink with supervision. Pt completed dressing with supervision while sitting in chair. Pt was educated on techniques for dressing while on back precautions. Continue POC.         SUBJECTIVE:     Ms. 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))    OBJECTIVE:     Domenico Im / Tod Bengali / AIRWAY: IV    RESTRICTIONS/PRECAUTIONS:  Position Activity Restriction  Spinal Precautions: No Bending,No Lifting,No Twisting        PAIN: VITALS / O2:   Pre Treatment: 3             Post Treatment: 3 Vitals          Oxygen            MOBILITY: I Mod I S SBA CGA Min Mod Max Total  NT x2 Comments:   Bed Mobility    Rolling [] [] [] [] [] [] [] [] [] [] []    Supine to Sit [] [] [] [] [] [] [] [] [] [] []    Scooting [] [] [] [] [] [] [] [] [] [] []    Sit to Supine [] [] [] [] [] [] [] [] [] [] []    Transfers    Sit to Stand [] [] [x] [] [] [] [] [] [] [] []    Bed to Chair [] [] [] [] [] [] [] [] [] [] []    Stand to Sit [] [] [x] [] [] [] [] [] [] [] []    Tub/Shower [] [] [] [] [] [] [] [] [] [] []     Toilet [] [] [x] [] [] [] [] [] [] [] []      [] [] [] [] [] [] [] [] [] [] []    I=Independent, Mod I=Modified Independent, S=Supervision/Setup, SBA=Standby Assistance, CGA=Contact Guard Assistance, Min=Minimal Assistance, Mod=Moderate Assistance, Max=Maximal Assistance, Total=Total Assistance, NT=Not Tested    ACTIVITIES OF DAILY LIVING: I Mod I S SBA CGA Min Mod Max Total NT Comments   BASIC ADLs:              Upper Body   Bathing [] [] [x] [] [] [] [] [] [] []    Lower Body Bathing [] [] [x] [] [] [] [] [] [] []    Toileting [] [] [x] [] [] [] [] [] [] []    Upper Body Dressing [] [] [x] [] [] [] [] [] [] []    Lower Body Dressing [] [] [x] [] [] [] [] [] [] []    Feeding [] [] [] [] [] [] [] [] [] []    Grooming [] [] [x] [] [] [] [] [] [] []    Personal Device Care [] [] [] [] [] [] [] [] [] []    Functional Mobility [] [] [] [] [] [] [] [] [] []    I=Independent, Mod I=Modified Independent, S=Supervision/Setup, SBA=Standby Assistance, CGA=Contact Guard Assistance, Min=Minimal Assistance, Mod=Moderate Assistance, Max=Maximal Assistance, Total=Total Assistance, NT=Not Tested    BALANCE: Good Fair+ Fair Fair- Poor NT Comments   Sitting Static [x] [] [] [] [] []    Sitting Dynamic [] [] [] [] [] []              Standing Static [x] [] [] [] [] []    Standing Dynamic [] [] [] [] [] [] PLAN:     FREQUENCY/DURATION   OT Plan of Care: 5 times/week for duration of hospital stay or until stated goals are met, whichever comes first.    ACUTE OCCUPATIONAL THERAPY GOALS:   (Developed with and agreed upon by patient and/or caregiver.)  1. Patient will complete lower body bathing and dressing with MOD I and adaptive equipment as needed. 2.Patient will complete upper body bathing and dressing with MOD I and adaptive equipment as needed. 3. Patient will complete toileting with MOD I.   4. Patient will tolerate 25 minutes of OT treatment with 1-2 rest breaks to increase activity tolerance for ADLs. 5. Patient will complete functional transfers with MOD I and adaptive equipment as needed. 6. Patient will complete functional activity with MOD I and adaptive equipment as needed. TREATMENT:     TREATMENT:   Self Care (38 minutes): Patient participated in upper body bathing, lower body bathing, toileting, upper body dressing, lower body dressing and grooming ADLs in standing with no visual and verbal cueing to increase activity tolerance. Patient also participated in energy conservation training to increase activity tolerance.      TREATMENT GRID:  N/A    AFTER TREATMENT PRECAUTIONS: Call light within reach, Chair, Needs within reach and RN notified    INTERDISCIPLINARY COLLABORATION:  RN/ PCT, PT/ PTA and OT/ KAUFFMAN    EDUCATION:       TOTAL TREATMENT DURATION AND TIME:  Time In: 0930  Time Out: [address]  Minutes: [address] [facility], [facility] [place]

## 2022-06-30 NOTE — CARE COORDINATION
Patient is medically ready for discharge per attending. Patient will discharge today home with home health. Patient resides in [place] with [employer]. Order placed and referral for home health PT/OT sent to [facility]. Therapy recommending rolling walker at discharge.  This was ordered, provided to patient and referral sent to [facility]

## 2022-06-30 NOTE — PLAN OF CARE
Problem: Pain  Goal: Verbalizes/displays adequate comfort level or baseline comfort level  6/30/2022 0951 by Cori Bertrand RN  Outcome: Completed  6/29/2022 2032 by Louisa Nassar RN  Outcome: Progressing     Problem: Safety - Adult  Goal: Free from fall injury  6/30/2022 0951 by Cori Bertrand RN  Outcome: Completed  6/29/2022 2032 by Louisa Nassar RN  Outcome: Progressing     Problem: ABCDS Injury Assessment  Goal: Absence of physical injury  6/30/2022 0951 by Cori Bertrand RN  Outcome: Completed  6/29/2022 2032 by Louisa Nassar RN  Outcome: Progressing     Problem: Discharge Planning  Goal: Discharge to home or other facility with appropriate resources  6/30/2022 0951 by Cori Bertrand RN  Outcome: Completed  Flowsheets (Taken 6/30/2022 0932)  Discharge to home or other facility with appropriate resources:   Identify barriers to discharge with patient and caregiver   Arrange for needed discharge resources and transportation as appropriate   Identify discharge learning needs (meds, wound care, etc)  6/29/2022 2032 by Louisa Nassar RN  Outcome: Progressing

## 2022-06-30 NOTE — PROGRESS NOTES
ORTH POST OP PROGRESS NOTE    2022  Admit Date: 2022  Admit Diagnosis: Lumbar stenosis with neurogenic claudication [M48.062]  Spondylolisthesis of lumbar region [M43.16]  Lumbar radiculopathy [M54.16]  Kyphosis of thoracolumbar region, unspecified kyphosis type [M40.205]  S/P lumbar fusion [Z98.1]  Procedure: Procedure(s):  L4-L5 direct lateral interbody fusion with interbody spacer/allograft  L4-L5 laminectomy and fusion with allograft and instrumentation; right S1 hemilaminectomy  Post Op day: 1 Day Post-Op      Subjective:     Clarisse Adams is a patient who has no complaints. Standing up in room. Feels well. Had pain last night. Will try to stay on top of it today. Objective:     Vital Signs:    Blood pressure 103/68, pulse 79, temperature 98.4 °F (36.9 °C), temperature source Oral, resp. rate 20, height 5' 7\" (1.702 m), weight 223 lb 3.2 oz (101.2 kg), SpO2 99 %. Temp (24hrs), Av.7 °F (37.1 °C), Min:97.9 °F (36.6 °C), Max:99.2 °F (37.3 °C)      No intake/output data recorded.  1901 -  0700  In: 1600 [I.V.:1600]  Out: 480 [Urine:380]    LAB:    No results for input(s): HGB, WBC, PLT in the last 72 hours. Physical Exam    General:   Alert and oriented. No acute distress  Lungs:  Respirations unlabored. Extremities: No evidence of cyanosis. Calves soft, nontender. Moves both upper and lower extremities. Dressing:  clean, dry, and intact  Neuro:  no deficit      Assessment:      Patient Active Problem List   Diagnosis    Spondylolisthesis at L4-L5 level    Spinal stenosis of lumbar region with neurogenic claudication    Obesity (BMI 30-39. 9)    Essential hypertension    Lumbar radiculopathy    Kyphosis of thoracolumbar region    S/P lumbar fusion       Plan:     Continue PT/OT  Discontinue: drain came out in recovery  Consult: none    Anticipate Discharge To: HOME today after PT       Signed By: Sherryle Look, PA-C

## 2022-06-30 NOTE — PROGRESS NOTES
Pt due for dressing change pre discharge per order. Changed pt dressing to L flank and lower back, covered with 4x4 guaze and tegaderm. Incision sites pink with no drainage. Pt tolerated well with no needs stated. Pt daughter at bedside.   Maggie Sexton

## 2022-06-30 NOTE — PROGRESS NOTES
PHYSICAL THERAPY Daily Note and AM  (Link to Caseload Tracking: PT Visit Days : 2  Time In/Out PT Charge Capture  Rehab Caseload Tracker  Orders      Miki Galeas is a 54 y.o. female   PRIMARY DIAGNOSIS: Spondylolisthesis at L4-L5 level  Lumbar stenosis with neurogenic claudication [M48.062]  Spondylolisthesis of lumbar region [M43.16]  Lumbar radiculopathy [M54.16]  Kyphosis of thoracolumbar region, unspecified kyphosis type [M40.205]  S/P lumbar fusion [Z98.1]  Procedure(s) (LRB):  L4-L5 direct lateral interbody fusion with interbody spacer/allograft (N/A)  L4-L5 laminectomy and fusion with allograft and instrumentation; right S1 hemilaminectomy (N/A)  1 Day Post-Op  Inpatient: Payor: Tayler Andres / Plan: [place] / Product Type: *No Product type* /     ASSESSMENT:     REHAB RECOMMENDATIONS:   Recommendation to date pending progress:  [facility] [place] [address] Therapy    Equipment:     Rolling Walker     ASSESSMENT:  Ms. Fadi Gil is making slow steady progress toward goals with increased gait distances and mobility. She demonstrated good mobiltiy throughout with SBA/supervision this session. She performed extended static/dynamic standing at the sink during ADLs. She did look and feel more confident with a RW and would benefit from having one at home. Reviewed spinal precautions with examples and teach back. SUBJECTIVE:   Ms. Fadi Gil states, \"I was so out of it last time. 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))  OBJECTIVE:     PAIN: VITALS / O2: PRECAUTION / Corazon Sours / DRAINS:   Pre Treatment:   Pain Assessment: 0-10  Pain Level: 3      Post Treatment: 3 Vitals Oxygen    None    RESTRICTIONS/PRECAUTIONS:  Position Activity Restriction  Spinal Precautions: No Bending,No Lifting,No Twisting     MOBILITY: I Mod I S SBA CGA Min Mod Max Total  NT x2 Comments:   Bed Mobility    Rolling [] [] [] [] [] [] [] [] [] [x] []    Supine to Sit [] [] [x] [] [] [] [] [] [] [] []    Scooting [] [] [x] [] [] [] [] [] [] [] []    Sit to Supine [] [] [] [] [] [] [] [] [] [x] []    Transfers    Sit to Stand [] [] [x] [] [] [] [] [] [] [] []    Bed to Chair [] [] [x] [x] [] [] [] [] [] [] []    Stand to Sit [] [] [x] [] [] [] [] [] [] [] []     [] [] [] [] [] [] [] [] [] [] []    I=Independent, Mod I=Modified Independent, S=Supervision, SBA=Standby Assistance, CGA=Contact Guard Assistance,   Min=Minimal Assistance, Mod=Moderate Assistance, Max=Maximal Assistance, Total=Total Assistance, NT=Not Tested    BALANCE: Good Fair+ Fair Fair- Poor NT Comments   Sitting Static [x] [] [] [] [] []    Sitting Dynamic [x] [] [] [] [] []              Standing Static [x] [x] [] [] [] []    Standing Dynamic [] [x] [] [] [] []      GAIT: I Mod I S SBA CGA Min Mod Max Total  NT x2 Comments:   Level of Assistance [] [] [x] [x] [] [] [] [] [] [] []    Distance 250 feet    DME Rolling Walker    Gait Quality Decreased step clearance and Trunk sway increased    Weightbearing Status      Stairs      I=Independent, Mod I=Modified Independent, S=Supervision, SBA=Standby Assistance, CGA=Contact Guard Assistance,   Min=Minimal Assistance, Mod=Moderate Assistance, Max=Maximal Assistance, Total=Total Assistance, NT=Not Tested    PLAN:   ACUTE PHYSICAL THERAPY GOALS:   (Developed with and agreed upon by patient and/or caregiver.)  1. Patient will perform bed mobility with INDEPENDENCE within 7 days. 2. Patient will transfer bed to chair with INDEPENDENCE within 7 days. 3. Patient will demonstrate FAIR+ DYNAMIC STANDING balance within 7 day(s). 4. Patient will ambulate 300ft+ with INDEPENDENCE within 7 days.   5. Patient

## 2022-07-01 ENCOUNTER — TELEPHONE (OUTPATIENT)
Dept: ORTHOPEDIC SURGERY | Age: 52
End: 2022-07-01

## 2022-07-01 ENCOUNTER — CARE COORDINATION (OUTPATIENT)
Dept: OTHER | Facility: CLINIC | Age: 52
End: 2022-07-01

## 2022-07-01 RX ORDER — IBUPROFEN 200 MG
200 TABLET ORAL EVERY 6 HOURS PRN
COMMUNITY

## 2022-07-01 NOTE — TELEPHONE ENCOUNTER
Having difficulty getting pain under control. Is taking 2 oxycodone 5 mg tablets every 6 hours along with tylenol. She ask if she can add some advil. I told her ok to add advil for the next couple of days until her pain is under control then to taper off because we don't want her on any n'saids right after fusion.

## 2022-07-01 NOTE — CARE COORDINATION
[facility] Transitions Initial Call    Call within 2 business days of discharge: Yes    Patient: Gretel Hickey Patient : 1970   MRN: [id number]  Reason for Admission:  Lumbar spine surgery  Discharge Date: 22 RARS: Readmission Risk Score: 4.1 ( )      Last Discharge  [place] [address]       Complaint Diagnosis Description Type Department Provider    22  S/P lumbar fusion . .. Admission (Discharged) [id number] Asiya Vazquez MD          Was this an external facility discharge? No Discharge Facility:   [address] [place] [address] to be reviewed by the provider   Additional needs identified to be addressed with provider: No  none             Method of communication with provider : none    Advance Care Planning:   Does patient have an Advance Directive: reviewed and current, reviewed and needs to be updated, not on file; education provided, not on file, patient declined education, decision maker updated and referral to internal ACP facilitator. Ambulatory Care Manager contacted the patient by telephone to perform post hospital discharge assessment. Verified name and  with patient as identifiers. Provided introduction to self, and explanation of the ACM role. ACM reviewed discharge instructions, medical action plan and red flags with patient who verbalized understanding. Patient given an opportunity to ask questions and does not have any further questions or concerns at this time. Were discharge instructions available to patient? Yes. Reviewed appropriate site of care based on symptoms and resources available to patient including: [facility]   When to call [phone]. The patient agrees to contact the Specialist's office for questions as needed. Medication reconciliation was performed with patient, who verbalizes understanding of administration of home medications. ACM provided contact information.  Plan for follow-up call in 5-7 days based on severity of symptoms and risk factors. Plan for next call: medication management-with addition of Ibuprofen per Dr Blair Cote and any questions in follow up regarding self mgmt;     Non-face-to-face services provided:  Obtained and reviewed discharge summary and/or continuity of care documents    Care Transitions 24 Hour Call    Are you an active caregiver in your home?: No  Care Transitions Interventions       Goals Addressed                 This Visit's Progress     Conditions and Symptoms   On track     Goal:  Demonstrates no signs of red flags or other postop complications;    Barriers: Altered Skin integrity, Postop risk    Plan for overcoming my barriers:    Educate and review on red flags of infection postop   Review importance of monitoring for red flags or other complications postop   Report any signs or symptoms of infection or other red flags to Surgeon's office;    Confidence: 10/10  Anticipated Goal Completion Date: 08/03/2022         Medication Management   Improving     Goal:  Demonstrates postop pain control, return to baseline activity level, self-care, and avoid constipation.     Barriers: impairment:  physical: limitations following surgery and postop pain  Plan for overcoming my barriers:    Follow the discharge instructions for postop activity level and when to progress;   Encourage patient to increase activity a little every day until returns to baseline;    Confidence: 10/10  Anticipated Goal Completion Date: 08/03/2022            Education Documentation  Educate activity level, taught by Alex Mario RN at 7/1/2022  2:36 PM.  Learner: Patient  Readiness: Acceptance  Method: Explanation  Response: Verbalizes Understanding    Educate active range of motion, taught by Alex Mario RN at 7/1/2022  2:36 PM.  Learner: Patient  Readiness: Acceptance  Method: Explanation  Response: Verbalizes Understanding    Educate signs and symptoms of infection, taught by Alex Mario RN at 7/1/2022  2:36 PM.  Learner: Patient  Readiness: Acceptance  Method: Explanation  Response: Verbalizes Understanding    Educate infection prevention measures, taught by Chayito Thomas, REYNA at 7/1/2022  2:36 PM.  Learner: Patient  Readiness: Acceptance  Method: Explanation  Response: Verbalizes Understanding    Educate incision care, taught by Chayito Thomas, REYNA at 7/1/2022  2:36 PM.  Learner: Patient  Readiness: Acceptance  Method: Explanation  Response: Verbalizes Understanding    Education Comments  No comments found.         Follow Up  Future Appointments   Date Time Provider Luke June   7/11/2022  9:15 AM Freda Hudson MD [facility] AMB   7/22/2022  9:30 AM Lita Johnson MD PFP [facility] AMB       Chayito Thomas, RN

## 2022-07-02 ENCOUNTER — HOME CARE VISIT (OUTPATIENT)
Dept: SCHEDULING | Facility: HOME HEALTH | Age: 52
End: 2022-07-02
Payer: COMMERCIAL

## 2022-07-02 VITALS
OXYGEN SATURATION: 98 % | DIASTOLIC BLOOD PRESSURE: 82 MMHG | SYSTOLIC BLOOD PRESSURE: 118 MMHG | RESPIRATION RATE: 16 BRPM | TEMPERATURE: 97.9 F | HEART RATE: 72 BPM

## 2022-07-02 PROCEDURE — G0151 HHCP-SERV OF PT,EA 15 MIN: HCPCS

## 2022-07-02 PROCEDURE — 400013 HH SOC

## 2022-07-02 ASSESSMENT — ENCOUNTER SYMPTOMS: PAIN LOCATION - PAIN QUALITY: ACHE

## 2022-07-05 ENCOUNTER — HOME CARE VISIT (OUTPATIENT)
Dept: SCHEDULING | Facility: HOME HEALTH | Age: 52
End: 2022-07-05
Payer: COMMERCIAL

## 2022-07-05 ENCOUNTER — TELEPHONE (OUTPATIENT)
Dept: ORTHOPEDIC SURGERY | Age: 52
End: 2022-07-05

## 2022-07-05 ENCOUNTER — CARE COORDINATION (OUTPATIENT)
Dept: OTHER | Facility: CLINIC | Age: 52
End: 2022-07-05

## 2022-07-05 VITALS
TEMPERATURE: 98 F | SYSTOLIC BLOOD PRESSURE: 140 MMHG | HEART RATE: 80 BPM | OXYGEN SATURATION: 98 % | RESPIRATION RATE: 16 BRPM | DIASTOLIC BLOOD PRESSURE: 95 MMHG

## 2022-07-05 DIAGNOSIS — Z98.890 STATUS POST LUMBAR SURGERY: Primary | ICD-10-CM

## 2022-07-05 PROCEDURE — G0151 HHCP-SERV OF PT,EA 15 MIN: HCPCS

## 2022-07-06 ENCOUNTER — CARE COORDINATION (OUTPATIENT)
Dept: OTHER | Facility: CLINIC | Age: 52
End: 2022-07-06

## 2022-07-06 ENCOUNTER — HOME CARE VISIT (OUTPATIENT)
Dept: SCHEDULING | Facility: HOME HEALTH | Age: 52
End: 2022-07-06
Payer: COMMERCIAL

## 2022-07-06 VITALS
SYSTOLIC BLOOD PRESSURE: 120 MMHG | RESPIRATION RATE: 18 BRPM | HEART RATE: 74 BPM | TEMPERATURE: 99.1 F | OXYGEN SATURATION: 99 % | DIASTOLIC BLOOD PRESSURE: 80 MMHG

## 2022-07-06 PROCEDURE — G0152 HHCP-SERV OF OT,EA 15 MIN: HCPCS

## 2022-07-06 RX ORDER — OXYCODONE HYDROCHLORIDE 5 MG/1
5 TABLET ORAL EVERY 6 HOURS PRN
Qty: 20 TABLET | Refills: 0 | Status: SHIPPED | OUTPATIENT
Start: 2022-07-06 | End: 2022-07-13

## 2022-07-06 ASSESSMENT — ENCOUNTER SYMPTOMS: PAIN LOCATION - PAIN QUALITY: ACHEY/SORE

## 2022-07-06 NOTE — CARE COORDINATION
Assessment/Plan: patient here today for recent fall and hit neck     No problem-specific Assessment & Plan notes found for this encounter  1  Neck pain on right side     2  Somatic dysfunction of rib region     3  Somatic dysfunction of thoracic region     4  Strain of muscle and tendon of back wall of thorax, initial encounter     5  Cervical somatic dysfunction     6  Cervical strain, initial encounter     7  Upper back pain on left side     8  Rib sprain, initial encounter            There are no diagnoses linked to this encounter  Subjective:      Patient ID: Sara Haro is a 78 y o  female  Slipped on marble steps falling on right side  Right arm, neck, pelvis  Hit right elbow first   Neck pains and getting HA's        The following portions of the patient's history were reviewed and updated as appropriate: allergies, current medications, past family history, past medical history, past social history, past surgical history and problem list     Review of Systems   Constitutional: Negative  Respiratory: Negative  Musculoskeletal: Positive for back pain, neck pain and neck stiffness  Skin: Negative  Neurological: Negative  Objective:      /82 (BP Location: Left arm, Patient Position: Sitting, Cuff Size: Standard)   Pulse 95   Temp 98 2 °F (36 8 °C) (Oral)   Ht 5' 5 75" (1 67 m)   LMP  (LMP Unknown)   SpO2 98%          Physical Exam   Constitutional: She appears well-developed and well-nourished  HENT:   Head: Normocephalic and atraumatic  Cardiovascular: Normal rate, regular rhythm and normal heart sounds  Musculoskeletal:   Stand: Inferior right innominate or pelvis SB right  Sit: Rib #1 on right up and anterior  Rib #2 on left posterior and tender  Multiple mid TV foldings  contact the PCP or Specialist's office for questions related to their healthcare. ACM provided contact information for future needs. Patients top risk factors for readmission: functional physical ability  medical condition-Risk for postop infection, Risk for uncontrolled pain, Risk for postop fall, injury  medication management      Interventions to address risk factors: Assessment and support for treatment adherence and medication management-  patient with a call out for refill pain medication      Plan for follow-up call in 5-7 days based on severity of symptoms and risk factors. Plan for next call: self management-of pain and monitoring for red flags like infection      Goals      Conditions and Symptoms      Goal:  Demonstrates no signs of red flags or other postop complications;    Barriers: Altered Skin integrity, Postop risk    Plan for overcoming my barriers:    Educate and review on red flags of infection postop   Review importance of monitoring for red flags or other complications postop   Report any signs or symptoms of infection or other red flags to Surgeon's office;    Confidence: 10/10  Anticipated Goal Completion Date: 08/03/2022         Medication Management      Goal:  Demonstrates postop pain control, return to baseline activity level, self-care, and avoid constipation.     Barriers: impairment:  physical: limitations following surgery and postop pain  Plan for overcoming my barriers:    Follow the discharge instructions for postop activity level and when to progress;   Encourage patient to increase activity a little every day until returns to baseline;    Confidence: 10/10  Anticipated Goal Completion Date: 08/03/2022              Care Transitions Subsequent and Final Call    Schedule Follow Up Appointment with PCP: Completed  Subsequent and Final Calls  Do you have any ongoing symptoms?: Yes  Onset of Patient-reported symptoms: Today  Patient-reported symptoms: Pain  Interventions for patient-reported symptoms: Notified PCP/Physician  Have your medications changed?: No  Do you have any questions related to your medications?: No  Do you currently have any active services?: Yes  Are you currently active with any services?: Home Health  Do you have any needs or concerns that I can assist you with?: No  Identified Barriers: None  Care Transitions Interventions     Other Therapy Services: Completed    Home Care Waiver: Completed Physical Therapy: Completed    Other Interventions:            Follow Up  Future Appointments   Date Time Provider Luke June   7/6/2022 To Be Determined Nahomy Singh, OT [place] [address]   7/7/2022 To Be Determined Madelin Jules PT [facility]   7/11/2022  9:15 AM Eve Rey MD [facility] AMB   7/13/2022 [facility] Be [facility]ined [facility]   7/15/2022 To Be Determined Anais Landis PTA [place] [address]   7/18/2022 To Be Determined [facility]   7/20/2022 To Be Determined [facility]   7/22/2022  9:30 AM Rafal Zuleta MD [facility] AMB       Ino Santiago RN

## 2022-07-06 NOTE — CARE COORDINATION
Incoming call from patient, verified identifiers;  Reports she did not hear back from [facility] from yesterday following her request for a refill for her Roxicodone;  CM outreach to Devon Jacobs, but when this CM was about to speak with Antonia Chung, Dr Mauro Dickinson nurse regarding the refill, Antonia Chung was on the phone with the patient speaking with her about the refill and sent to [facility] to [place] in [place] per patient's request;  CM outreach and verified patient did receive the new refill at [facility] today as requested;

## 2022-07-08 ENCOUNTER — HOME CARE VISIT (OUTPATIENT)
Dept: SCHEDULING | Facility: HOME HEALTH | Age: 52
End: 2022-07-08
Payer: COMMERCIAL

## 2022-07-08 VITALS
TEMPERATURE: 98.2 F | OXYGEN SATURATION: 95 % | SYSTOLIC BLOOD PRESSURE: 120 MMHG | RESPIRATION RATE: 16 BRPM | HEART RATE: 68 BPM | DIASTOLIC BLOOD PRESSURE: 80 MMHG

## 2022-07-08 PROCEDURE — G0151 HHCP-SERV OF PT,EA 15 MIN: HCPCS

## 2022-07-11 ENCOUNTER — OFFICE VISIT (OUTPATIENT)
Dept: ORTHOPEDIC SURGERY | Age: 52
End: 2022-07-11

## 2022-07-11 DIAGNOSIS — Z98.1 STATUS POST LUMBAR SPINAL ARTHRODESIS: Primary | ICD-10-CM

## 2022-07-11 PROCEDURE — 99024 POSTOP FOLLOW-UP VISIT: CPT | Performed by: ORTHOPAEDIC SURGERY

## 2022-07-11 NOTE — PROGRESS NOTES
Name: Mamta Welsh  YOB: 1970  Gender: female  MRN: [id number]  Age: 54 y.o. Chief Complaint: Lumbar spine surgery follow up    History of Present Illness:      Veronica Adams  is here for 2-week follow up of her  DLIF surgery. She reports significant relief of preoperative lower extremity pain, weakness and parasthesias. There is the expected residual back stiffness. Medications:       Current Outpatient Medications:     oxyCODONE (ROXICODONE) 5 MG immediate release tablet, Take 1 tablet by mouth every 6 hours as needed for Pain for up to 7 days. Intended supply: 5 days. Take lowest dose possible to manage pain, Disp: 20 tablet, Rfl: 0    docusate sodium (COLACE) 100 MG capsule, Take 100 mg by mouth 3 times daily as needed for Constipation. , Disp: , Rfl:     ibuprofen (ADVIL;MOTRIN) 200 MG tablet, Take 200 mg by mouth every 6 hours as needed for Pain (For temporary pain postop per Dr Jess Ku MA), Disp: , Rfl:     acetaminophen (TYLENOL) 500 MG tablet, Take 1,000 mg by mouth every 6 hours as needed for Pain, Disp: , Rfl:     hydroCHLOROthiazide (HYDRODIURIL) 25 MG tablet, Take 25 mg by mouth daily not taking, Disp: , Rfl:     omeprazole (PRILOSEC) 40 MG delayed release capsule, Take 40 mg by mouth daily, Disp: , Rfl:     Allergies:  No Known Allergies      Physical Exam:      Respirations are unlabored and there is no evidence of cyanosis      Wound is healing nicely without erythema, drainage or underlying fluctuance    Gait is improved    Sensory testing reveals intact sensation to light touch and in the distribution of the L3-S1 dermatomes bilaterally.     Strength testing in the lower extremity reveals the following based on the 5 point grading scale:       HF (L2) H Ab (L5) KE (L3/4) ADF (L4) EHL (L5) A Ev (S1) APF (S1)   Right 5 5 5 5 5 5 5   Left 5 5 5 5 5 5 5        Radiographic Studies:     X-rays including AP and lateral views of the lumbar spine were reviewed and interpreted:     Postoperative changes are noted status post lumbar fusion with instrumentation. The hardware appears to be well-placed and intact. There is no evidence of significant osteolysis. No significant adjacent level decompensation. Alignment is maintained. Radiographic Impression:    Appropriate postoperative changes status post lumbar laminectomy and fusion without evidence for hardware failure or decompensation. Diagnosis:      ICD-10-CM    1. Status post lumbar spinal arthrodesis  Z98.1        Assessment/Plan: This patient is following the expected course following the spine surgery. I encouraged her to walk as much as possible for exercise. She can also begin a stationary bike, or other low impact aerobic exercise. We will limit lifting to 10 pounds for the next several weeks. Prolonged sitting should be avoided to minimize the strain on the lumbar area. Thereafter, activities will be gradually increased as tolerated. I will have her return for follow up in 6 weeks. She will call when she feels well enough to return to work and we can release her for that.           Mamta Elder MD    07/11/22  9:27 AM

## 2022-07-13 ENCOUNTER — HOME CARE VISIT (OUTPATIENT)
Dept: SCHEDULING | Facility: HOME HEALTH | Age: 52
End: 2022-07-13
Payer: COMMERCIAL

## 2022-07-13 VITALS
RESPIRATION RATE: 17 BRPM | HEART RATE: 86 BPM | OXYGEN SATURATION: 97 % | TEMPERATURE: 98.4 F | SYSTOLIC BLOOD PRESSURE: 105 MMHG | DIASTOLIC BLOOD PRESSURE: 78 MMHG

## 2022-07-13 VITALS
DIASTOLIC BLOOD PRESSURE: 78 MMHG | HEART RATE: 72 BPM | SYSTOLIC BLOOD PRESSURE: 104 MMHG | TEMPERATURE: 97.7 F | OXYGEN SATURATION: 98 % | RESPIRATION RATE: 16 BRPM

## 2022-07-13 PROCEDURE — G0152 HHCP-SERV OF OT,EA 15 MIN: HCPCS

## 2022-07-13 PROCEDURE — G0151 HHCP-SERV OF PT,EA 15 MIN: HCPCS

## 2022-07-13 ASSESSMENT — ENCOUNTER SYMPTOMS
PAIN LOCATION - PAIN QUALITY: ACHE, TIGHTNESS
PAIN LOCATION - PAIN QUALITY: ACHEY

## 2022-07-14 ENCOUNTER — TELEPHONE (OUTPATIENT)
Dept: ORTHOPEDIC SURGERY | Age: 52
End: 2022-07-14

## 2022-07-14 ENCOUNTER — CARE COORDINATION (OUTPATIENT)
Dept: OTHER | Facility: CLINIC | Age: 52
End: 2022-07-14

## 2022-07-14 DIAGNOSIS — Z98.1 STATUS POST LUMBAR SPINAL ARTHRODESIS: Primary | ICD-10-CM

## 2022-07-14 RX ORDER — OXYCODONE HYDROCHLORIDE 5 MG/1
5 TABLET ORAL EVERY 6 HOURS PRN
Qty: 28 TABLET | Refills: 0 | Status: SHIPPED | OUTPATIENT
Start: 2022-07-14 | End: 2022-07-21

## 2022-07-14 NOTE — ADT AUTH CERT
ORIGINAL CASE CREATED IN AVAILITY                Comments  Comment          Patient Demographics    Name Patient ID SSN Gender Identity Birth Date   Sonya Lopez [id number]  Female [birth year] (51 yrs)     Address Phone Email Employer    [address] [facility] [address] Nadege Mora [phone] (U)   [phone] (S)   [phone] [email or web address] [id number] [facility] Race Occupation Emp Status    [employer]ian[employer] (non-) -- Full Time      Reg Status PCP Date Last Verified Next Review Date    Verified Susy Rubio  [phone] 06/16/22 07/16/22      Admission Date Discharge Date Admitting Provider     06/29/22 06/30/22 Toni Hylton MD       Marital Status Roman Catholic       [facility]        Emergency Contact 1   Amaury Avila (3)   Luis   [phone] (H)       Utilization Reviews         Lumbar Fusion - Care Day 1 (6/29/2022) by Rafael Resendiz RN       Review Entered Review Status   6/30/2022 09:51 Completed      Criteria Review      Care Day: 1 Care Date: 6/29/2022 Level of Care: Inpatient Floor    Guideline Day 1    Level Of Care    (X) OR to floor    6/30/2022 9:51 AM EDT by Luna Crump      ortho    Clinical Status    ( ) * Clinical Indications met    6/30/2022 9:51 AM EDT by Luna Crump      98.7 16 98 152/77 93%NC3L pain 8,6 101.2kg    Activity    (X) Possibly assisted, limited ambulation postoperatively    6/30/2022 9:51 AM EDT by Luna Crump      activity as tolerated    Routes    (X) IV fluids    6/30/2022 9:51 AM EDT by [email or web address]    Interventions    (X) DVT prophylaxis    6/30/2022 9:51 AM EDT by Luna Crump      SCDs    Medications    (X) Prophylactic antibiotics    6/30/2022 9:51 AM EDT by Luna Crump      ancef 2000mg ivq8 ancef 2000mg ivx1    ( ) Possible PCA    6/30/2022 9:51 AM EDT by Luna Crump      dilaudid 0.5mg [id number] prnx3    * Milestone   Additional Notes   6/29 LOC IP Ortho      OP Note   PREOPERATIVE DIAGNOSIS: Lumbar stenosis and spondylolisthesis   POSTOPERATIVE DIAGNOSIS: Lumbar stenosis and spondylolisthesis   PROCEDURE:   Lateral flank incision   1.  L4 - L5 direct lateral arthrodesis    2. Insertion biomechanical device L4 through L5    Posterior incision    3. Lumbar laminectomy  L4 through L5  with bilateral foraminotomies. 4. Lumbar posterolateral fusion  L4 through L5 .    5. Cortical screw instrumentation  L4 through L5 .    6. Allograft    7. Right S1 hemilaminectomy    ANESTHESIA: General   ESTIMATED BLOOD LOSS: 100 ml   POSTOPERATIVE CONDITION: Stable. INTRAOPERATIVE COMPLICATIONS: None. INDICATIONS FOR PROCEDURE: Back and leg pain consistent with claudication/lumbar radiculitis that is no longer responsive to conservative measures. Walking and standing tolerances have diminished. Imaging studies are concordant, showing lumbar stenosis. In the outpatient setting, the risks, benefits, and potential complications of the above listed procedure were discussed and an informed consent was obtained. H&P   Assessment/Plan:     This patient's clinical history and physical exam is consistent with neurogenic claudication due to spondylolisthesis, stenosis, and lumbar kyphosis.  The imaging studies are concordant with the patient's symptoms. Conservative efforts have been reasonably  exhausted and the patient feels like she cannot go on with the symptoms as they are. We have previously discussed surgical options and now Olimpia Dolan would like to proceed with surgical scheduling. Rickie Hands looks like there is an adequate window for a lateral approach.  A DLIF would stabilize the kyphotic disc better than a TLIF. PT   ASSESSMENT:   Ms. Sami Christian is a pleasant 54year old female s/p L4-L5 laminectomy and direct/lateral fusion with spinal precautions. Patient is seen this PM for initial PT evaluation post operatively.  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 Reports B LE pain and decreased community-level ambulation tolerance PTA. Today, patient presents with decreased functional BLE strength, activity tolerance, and balance/gait status. Required minimal assistance x2 for mobility this PM; see detailed assessment below. Anticipate patient will progress well toward stated goals. Recommend HHPT at discharge. Will follow with stated BID plan of care during acute stay. OT   ASSESSMENT:   Ms. Michael Christine presented to the hospital for elective spine procedure listed above. At baseline, pt is independent for all ADLs and IADLs. 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 [place] . Today, pt was received supine in bed. Completed bed mobility with Sonia. CGA for LB dressing. Sit>stand Sonia x2. Began to ambulate to toilet Sonia x2 but became very unsteady, pale, and lightheaded. Transferred to [facility] instead for safety. Vitals normal. Performed toileting with Sonia. SPT to recliner Sonia x2. Pt emotional regarding her legs feeling shaky. Educated on spinal precautions and implications on ADLs/mobility. Anticipate pt is going to progress well and be able to d/c home with [facility] therapy services. Isrrael Adams currently demonstrates overall deficits in strength, balance, activity tolerance, and ADL performance.  Patient would benefit from skilled OT services at this time in order to address functional deficits and OT goals stated above.        Lumbar Fusion - Clinical Indications for Procedure by Lisset Sultana RN       Review Entered Review Status   6/30/2022 09:48 Completed      Criteria Review      Clinical Indications for Procedure    Most Recent : Launie Olszewski Most Recent Date: 6/30/2022 9:48 AM EDT    ( ) Procedure is indicated for  1 or more  of the following  (1) (2) (3) (4) (5) (6) (7):       ( ) Spinal stenosis surgery requiring stabilization with fusion, as indicated by  ALL  of the       following  (18) (19) (20):          ( ) Lumbar spinal stenosis treatment needed, as indicated by  1 or more  of the following :             ( ) Rapidly progressive or very severe symptoms of neurogenic claudication with imaging findings             of lumbar spinal stenosis that correlate to clinical findings (21)             6/30/2022 9:48 AM EDT by Randi Temo               Back and leg pain consistent with claudication/lumbar radiculitis that is no longer responsive to conservative measures     Last H&P Note      H&P by Latrice Ding MD at 6/29/2022  7:01 AM    Author: Latrice Ding MD Specialty: Orthopedic Surgery: Spine Surgery, Orthopedic Surgery Author Type: Physician   Filed: 6/29/2022  7:01 AM Date of Service: 6/29/2022  7:01 AM Status: Signed   : Latrice Ding MD (Physician)      Name: Zahra Mcclellan   Date of Birth: 1970   Gender: female   MRN: [id number]   Age: 54 y.o.         Chief Complaint:  Radiating back and leg pain      History of Present Illness:        This is a patient who has been followed by Isiah Pfeiffer and has a 4 or 5-year history of back pain and limited tolerance for walking and standing.  The majority of her pain is in her low back and it does radiate to the left buttock and leg.  She has numbness  and tingling in the leg to the posterior lateral thigh and distal to the knee as well.  Sitting down seems to be the alleviating factor.  She has tried regimens of NSAIDs and home exercises. Julia Driscoll was also referred to physical therapy and has been doing  that for the last 4 weeks. Julia Driscoll has had an epidural injection as recent as March 24.  Ultimately, her symptoms continue to progress and she is frustrated with her situation. Julia Driscoll would like to consider surgical options.        Medications:          Current Outpatient Medications:      nabumetone (RELAFEN) 500 mg tablet, Take 1 Tablet by mouth two (2) times daily as needed for Pain., Disp: 60 Tablet, Rfl: 3                4.  Lumbar stenosis with neurogenic claudication   U39.025  724.03       2.  Spondylolisthesis of lumbar region   M43.16 [id number]. 4             6.  Lumbar radiculopathy   W51.80  820. 4             Assessment/Plan:        This patient's clinical history and physical exam is consistent with neurogenic claudication due to spondylolisthesis, stenosis, and lumbar kyphosis.  The imaging studies are concordant with the patient's symptoms. Conservative efforts have been reasonably  exhausted and the patient feels like she cannot go on with the symptoms as they are. We have previously discussed surgical options and now Clifford Reyes would like to proceed with surgical scheduling. Doretha Flores looks like there is an adequate window for a lateral approach.  A DLIF would stabilize the kyphotic disc better than a TLIF.      We discussed surgical options including a combined direct lateral fusion and posterior fusion.  We discussed the details of the the surgery including a small lateral incision over  the flank followed by dissection to the area of disc collapse and deformity. This would be done using neural monitoring and a series of minimally invasive retractors.   Once identifying the disk space radiographically, the retractors would be docked and  the disk would be excised.  The disk space would be distracted as much as possible and measured for the appropriate sized peak cage.   This would be packed with allograft and likely bone marrow aspirate. Lateral screws may be applied for supplemental  stability.  The wound would then be closed with suture and covered with sterile dressings.  She would expect to stay in the hospital 1-2 days  or until she can get about safely with minimal assistance.  A short stay in a rehabilitation facility could also be considered depending on how  quickly she recovers.  Follow-up would be scheduled for 2 weeks and there will be restrictions including no driving, and no lifting greater than  15 lbs until follow up with me. She was encouraged to walk as much as possible before and after the operation to facilitate an expeditious recovery.   We also discussed the potential risks of the surgery including, but not limited to infection, spinal fluid leak and potential headaches requiring them to remain supine or have a lumbar drain inserted post-operatively; injury to the cauda equina or peripheral  nerve root resulting in paralysis, bowel or bladder injury or dysfunction, or loss of use of an extremity; persistent back or leg symptoms or pain at the bone graft site; recurrence of stenosis or the development of instability, or failure of the hardware  or fusion to heal possibly needing additional surgery;  blood loss requiring transfusion; and the risks of anesthesia including, but not limited heart attack, stroke, and blood clot, and death.   Also there is the risk of visceral, vascular, renal, or  other intra-abdominal injury. She also understands that patients likely have groin pain and anterior thigh pain due to dissection through the  iliopsoas.  The patient voiced an understanding of these issues and would like to proceed with surgical scheduling.  The procedure we will plan for is a direct lateral fusion at L4-L5 with interbody cage, allograft, and posterior laminectomy and fusion  with instrumentation at L4 L5 and a right S1 hemilaminectomy.

## 2022-07-14 NOTE — TELEPHONE ENCOUNTER
She saw CDV Monday. She says he asked if she needed refills and she told him no. She has increased her activity level and is having some issues resting at night. She does want a refill on the pain meds to [facility].

## 2022-07-14 NOTE — CARE COORDINATION
[facility] Transitions Follow Up Call    2022    Patient: Owen Springer  Patient : 1970   MRN: [id number]    Discharge Date: 22 RARS: Readmission Risk Score: 4.1 ( )    Attempted to reach patient for transitions of care follow up. Unable to reach patient. Left VM message with this CM's name and name of CM Дмитрий Dumont RN, who will continue to to follow for ongoing CM postop needs; Last Discharge [employer] [employer]       Complaint Diagnosis Description Type Department Provider    22  S/P lumbar fusion . ..  Admission (Discharged) [id number] Freda Hudson MD        Noted following upcoming appointments from discharge chart review:     Future Appointments   Date Time Provider Luke June   7/15/2022 To Be Determined Chandler Carrillo, STEVIE Fong   2022 To Be Determined Chandler Carrillo, PT Mikey Fong   2022 To Be Determined Chandler Carrillo, [address]   2022  9:30 AM Lita Johnson MD [facility] AMB   2022  8:45 AM Freda Hudson MD PO[facility] AMB       Chayito Thomas RN

## 2022-07-15 ENCOUNTER — HOME CARE VISIT (OUTPATIENT)
Dept: SCHEDULING | Facility: HOME HEALTH | Age: 52
End: 2022-07-15
Payer: COMMERCIAL

## 2022-07-15 VITALS
RESPIRATION RATE: 16 BRPM | TEMPERATURE: 98 F | HEART RATE: 72 BPM | OXYGEN SATURATION: 98 % | DIASTOLIC BLOOD PRESSURE: 84 MMHG | SYSTOLIC BLOOD PRESSURE: 120 MMHG

## 2022-07-15 PROCEDURE — G0151 HHCP-SERV OF PT,EA 15 MIN: HCPCS

## 2022-07-15 ASSESSMENT — ENCOUNTER SYMPTOMS: PAIN LOCATION - PAIN QUALITY: ACHE

## 2022-07-22 ENCOUNTER — CARE COORDINATION (OUTPATIENT)
Dept: OTHER | Facility: CLINIC | Age: 52
End: 2022-07-22

## 2022-07-22 ENCOUNTER — OFFICE VISIT (OUTPATIENT)
Dept: FAMILY MEDICINE CLINIC | Facility: CLINIC | Age: 52
End: 2022-07-22
Payer: COMMERCIAL

## 2022-07-22 VITALS
SYSTOLIC BLOOD PRESSURE: 122 MMHG | WEIGHT: 213 LBS | TEMPERATURE: 98.1 F | HEART RATE: 92 BPM | BODY MASS INDEX: 33.36 KG/M2 | DIASTOLIC BLOOD PRESSURE: 78 MMHG | OXYGEN SATURATION: 100 %

## 2022-07-22 DIAGNOSIS — I10 ESSENTIAL HYPERTENSION: Primary | ICD-10-CM

## 2022-07-22 PROCEDURE — 99213 OFFICE O/P EST LOW 20 MIN: CPT | Performed by: STUDENT IN AN ORGANIZED HEALTH CARE EDUCATION/TRAINING PROGRAM

## 2022-07-22 RX ORDER — HYDROCHLOROTHIAZIDE 25 MG/1
25 TABLET ORAL DAILY
Qty: 90 TABLET | Refills: 1 | Status: SHIPPED | OUTPATIENT
Start: 2022-07-22

## 2022-07-22 RX ORDER — OMEPRAZOLE 40 MG/1
40 CAPSULE, DELAYED RELEASE ORAL DAILY
Qty: 90 CAPSULE | Refills: 1 | Status: SHIPPED | OUTPATIENT
Start: 2022-07-22

## 2022-07-22 SDOH — ECONOMIC STABILITY: FOOD INSECURITY: WITHIN THE PAST 12 MONTHS, THE FOOD YOU BOUGHT JUST DIDN'T LAST AND YOU DIDN'T HAVE MONEY TO GET MORE.: NEVER TRUE

## 2022-07-22 SDOH — ECONOMIC STABILITY: FOOD INSECURITY: WITHIN THE PAST 12 MONTHS, YOU WORRIED THAT YOUR FOOD WOULD RUN OUT BEFORE YOU GOT MONEY TO BUY MORE.: NEVER TRUE

## 2022-07-22 ASSESSMENT — ENCOUNTER SYMPTOMS: SHORTNESS OF BREATH: 0

## 2022-07-22 ASSESSMENT — ANXIETY QUESTIONNAIRES
3. WORRYING TOO MUCH ABOUT DIFFERENT THINGS: 0
5. BEING SO RESTLESS THAT IT IS HARD TO SIT STILL: 0
GAD7 TOTAL SCORE: 0
2. NOT BEING ABLE TO STOP OR CONTROL WORRYING: 0
4. TROUBLE RELAXING: 0
7. FEELING AFRAID AS IF SOMETHING AWFUL MIGHT HAPPEN: 0
1. FEELING NERVOUS, ANXIOUS, OR ON EDGE: 0
6. BECOMING EASILY ANNOYED OR IRRITABLE: 0

## 2022-07-22 ASSESSMENT — PATIENT HEALTH QUESTIONNAIRE - PHQ9
SUM OF ALL RESPONSES TO PHQ QUESTIONS 1-9: 0
2. FEELING DOWN, DEPRESSED OR HOPELESS: 0
SUM OF ALL RESPONSES TO PHQ QUESTIONS 1-9: 0
1. LITTLE INTEREST OR PLEASURE IN DOING THINGS: 0
SUM OF ALL RESPONSES TO PHQ QUESTIONS 1-9: 0
SUM OF ALL RESPONSES TO PHQ QUESTIONS 1-9: 0
SUM OF ALL RESPONSES TO PHQ9 QUESTIONS 1 & 2: 0

## 2022-07-22 ASSESSMENT — SOCIAL DETERMINANTS OF HEALTH (SDOH): HOW HARD IS IT FOR YOU TO PAY FOR THE VERY BASICS LIKE FOOD, HOUSING, MEDICAL CARE, AND HEATING?: NOT HARD AT ALL

## 2022-07-22 NOTE — CARE COORDINATION
[facility] Transitions Follow Up Call    2022    Patient: Owen Springer  Patient : 1970   MRN: [id number]  Reason for Admission: DLIF  Discharge Date: 22 RARS: Readmission Risk Score: 4.1 ( )       Care Transitions Follow Up Call    Needs to be reviewed by the provider   Additional needs identified to be addressed with provider: No  none             Method of communication with provider : none      Ambulatory Care Manager contacted the patient by telephone to follow up after admission on 2022. Verified name and  with patient as identifiers. Addressed changes since last contact:  patient states she is doing really well. Walking as much as possible. Was discharged from home health PT and OT. States she had her follow up appointment last week and will follow up again in 5 weeks. Taking tylenol and advil during the day. May take a pain pill at night if needed. Incisions are healing well. Patient states she plans to go back to work in 3 weeks. Discussed follow-up appointments. If no appointment was previously scheduled, appointment scheduling offered: n/a. Is follow up appointment scheduled within 7 days of discharge? N/a. Advance Care Planning:   Does patient have an Advance Directive: not on file. ACM reviewed discharge instructions, medical action plan and red flags with patient and discussed any barriers to care and/or understanding of plan of care after discharge. Discussed appropriate site of care based on symptoms and resources available to patient including: PCP  Specialist  Benefits related nurse triage line  Urgent care clinics  [facility]   When to call [address] The patient agrees to contact the PCP office for questions related to their healthcare.      Patients top risk factors for readmission: medical condition-s/p DLIF  Interventions to address risk factors: Obtained and reviewed discharge summary and/or continuity of care documents and Assessment and support for treatment adherence and medication management-patient attending f/u appts and all goals met with home health care. Non-[facility] follow up appointment(s):     ACM provided contact information for future needs. No further follow-up call indicated based on severity of symptoms and risk factors. Patient states she is doing really well and has no further care management needs. No further outreach scheduled with this ACM, ACM will sign off care team at this time. Episode of Care resolved. Patient has this ACM's contact information if future needs arise. Care Transitions Subsequent and Final Call    Schedule Follow Up Appointment with PCP: Completed  Subsequent and Final Calls  Do you have any ongoing symptoms?: No  Have your medications changed?: No  Do you have any questions related to your medications?: No  Do you currently have any active services?: Yes  Are you currently active with any services?: Home Health  Do you have any needs or concerns that I can assist you with?: No  Identified Barriers: None  Care Transitions Interventions     Other Therapy Services: Completed    Home Care Waiver: Completed Physical Therapy: Completed    Other Interventions:              Follow Up  Future Appointments   Date Time Provider Luke June   8/22/2022  8:45 AM Siobhan Mccarthy MD [facility] AM[facility]   1/13/2023  8:45 AM PFP LAB [facility] AMB   1/20/2023 10:00 AM Brandan Eckert MD [facility] AMB       Mary Villafuerte, RN

## 2022-07-22 NOTE — PROGRESS NOTES
[facility]  Pavel Solis  Phone [phone]  Fax:  [phone]    Donis Whitman (:  1970) is a 54 y.o. female here for evaluation of the following chief complaint(s):  Follow-up       ASSESSMENT/PLAN:  1. Essential hypertension    Blood pressure well controlled, continue hydrochlorothiazide 25 mg daily. BMP back in  unremarkable. Will plan for colon cancer screening at age 50. Up-to-date on Pap smear. Return in about 6 months (around 2023) for labs prior, routine f/u. Subjective   SUBJECTIVE/OBJECTIVE:  HPI  53-year-old female with PMH of hypertension and chronic low back pain who presents for 3 month hypertension follow-up.  -No concerns  -Underwent lumbar fusion in late , low back pain improved  -BMP and CBC 2022 were normal  -Negative Pap smear with negative HPV 2022  -Patient plans to just wait til age 50 for colon cancer screening    Review of Systems   Respiratory:  Negative for shortness of breath. Cardiovascular:  Negative for chest pain and leg swelling. Neurological:  Negative for dizziness, syncope and headaches. Objective     Vitals:    22 0937   BP: 122/78   Pulse: 92   Temp: 98.1 °F (36.7 °C)   SpO2: 100%       Physical Exam  Vitals reviewed. Constitutional:       General: She is not in acute distress. Appearance: She is obese. HENT:      Head: Normocephalic and atraumatic. Cardiovascular:      Rate and Rhythm: Normal rate and regular rhythm. Pulmonary:      Effort: Pulmonary effort is normal.      Breath sounds: Normal breath sounds. Musculoskeletal:      Right lower leg: No edema. Left lower leg: No edema. Skin:     General: Skin is warm and dry. Neurological:      General: No focal deficit present. Mental Status: She is alert and oriented to person, place, and time. An electronic signature was used to authenticate this note.     --Marco Mcbride MD

## 2022-08-10 ENCOUNTER — TELEPHONE (OUTPATIENT)
Dept: ORTHOPEDIC SURGERY | Age: 52
End: 2022-08-10

## 2022-08-22 ENCOUNTER — OFFICE VISIT (OUTPATIENT)
Dept: ORTHOPEDIC SURGERY | Age: 52
End: 2022-08-22

## 2022-08-22 DIAGNOSIS — Z98.1 STATUS POST LUMBAR SPINAL ARTHRODESIS: Primary | ICD-10-CM

## 2022-08-22 PROCEDURE — 99024 POSTOP FOLLOW-UP VISIT: CPT | Performed by: ORTHOPAEDIC SURGERY

## 2022-08-22 NOTE — PROGRESS NOTES
significant osteolysis. No significant adjacent level decompensation. Alignment is maintained. Radiographic Impression:    Appropriate postoperative changes status post lumbar laminectomy and fusion without evidence for hardware failure or decompensation. Diagnosis:      ICD-10-CM    1. Status post lumbar spinal arthrodesis  Z98.1           Assessment/Plan: This patient is following the expected course following the spine surgery. She can continue to gradually increase activities as tolerated. I will have her return for follow up in 4 months.            Curtis Naranjo MD    08/22/22  8:33 AM

## 2022-11-10 ENCOUNTER — PATIENT MESSAGE (OUTPATIENT)
Dept: FAMILY MEDICINE CLINIC | Facility: CLINIC | Age: 52
End: 2022-11-10

## 2022-11-10 RX ORDER — NABUMETONE 500 MG/1
500 TABLET, FILM COATED ORAL 2 TIMES DAILY PRN
Qty: 180 TABLET | Refills: 3 | Status: SHIPPED | OUTPATIENT
Start: 2022-11-10

## 2022-11-10 RX ORDER — OMEPRAZOLE 20 MG/1
20 CAPSULE, DELAYED RELEASE ORAL
Qty: 180 CAPSULE | Refills: 3 | Status: SHIPPED | OUTPATIENT
Start: 2022-11-10

## 2022-11-29 ENCOUNTER — TELEPHONE (OUTPATIENT)
Dept: FAMILY MEDICINE CLINIC | Facility: CLINIC | Age: 52
End: 2022-11-29

## 2022-11-29 NOTE — TELEPHONE ENCOUNTER
----- Message from Artemio Logan sent at 11/29/2022  8:14 AM EST -----  Subject: Message to Provider    QUESTIONS  Information for Provider? patient called and she said she has the flu,   fever - 101.2, nausea, body aches, headache she wondered if provider could   send out a prescription for flu and nausea. ---------------------------------------------------------------------------  --------------  Jesus BRANTLEY  [phone]; OK to leave message on voicemail  ---------------------------------------------------------------------------  --------------  SCRIPT ANSWERS  Relationship to Patient?  Self

## 2022-11-30 RX ORDER — ONDANSETRON 4 MG/1
4 TABLET, FILM COATED ORAL DAILY PRN
Qty: 30 TABLET | Refills: 0 | Status: SHIPPED | OUTPATIENT
Start: 2022-11-30

## 2022-11-30 NOTE — TELEPHONE ENCOUNTER
----- Message from Lindsay Adams sent at 11/30/2022 11:32 AM EST -----  Regarding: flu  Is there anyway Dr. Reanna Tena can call me in some zofran? I have been terribly nauseous with some vomiting and diarrhea too. If so, please call it to [facility] in [place]. Thank you so much and I hope you have a good day.

## 2023-01-05 ENCOUNTER — OFFICE VISIT (OUTPATIENT)
Dept: ORTHOPEDIC SURGERY | Age: 53
End: 2023-01-05

## 2023-01-05 DIAGNOSIS — Z98.1 STATUS POST LUMBAR SPINAL ARTHRODESIS: Primary | ICD-10-CM

## 2023-01-05 NOTE — PROGRESS NOTES
Name: Paco Hamilton  YOB: 1970  Gender: female  MRN: [id number]  Age: 50 y.o. Chief Complaint: Lumbar spine surgery follow up    History of Present Illness:      Rosie Adams  is here for 6 month follow up of her  lumbar posteolateral fusion and DLIF surgery. She reports significant relief of preoperative lower extremity pain, weakness and parasthesias. There is the expected residual back stiffness. Medications:       Current Outpatient Medications:     ondansetron (ZOFRAN) 4 MG tablet, Take 1 tablet by mouth daily as needed for Nausea or Vomiting, Disp: 30 tablet, Rfl: 0    omeprazole (PRILOSEC) 20 MG delayed release capsule, Take 1 capsule by mouth 2 times daily (before meals), Disp: 180 capsule, Rfl: 3    nabumetone (RELAFEN) 500 MG tablet, Take 1 tablet by mouth 2 times daily as needed for Pain, Disp: 180 tablet, Rfl: 3    hydroCHLOROthiazide (HYDRODIURIL) 25 MG tablet, Take 1 tablet by mouth in the morning., Disp: 90 tablet, Rfl: 1    ibuprofen (ADVIL;MOTRIN) 200 MG tablet, Take 200 mg by mouth every 6 hours as needed for Pain (For temporary pain postop per Dr Kimball Gilford MA), Disp: , Rfl:     acetaminophen (TYLENOL) 500 MG tablet, Take 1,000 mg by mouth every 6 hours as needed for Pain, Disp: , Rfl:     Allergies:  No Known Allergies      Physical Exam:      Respirations are unlabored and there is no evidence of cyanosis      Gait is improved     Radiographic Studies:     X-rays including AP and lateral views of the lumbar spine were reviewed and interpreted:     Postoperative changes are noted status post lumbar fusion with instrumentation. The hardware appears to be well-placed and intact. There is no evidence of significant osteolysis. No significant adjacent level decompensation. Alignment is maintained.     Radiographic Impression:    Appropriate postoperative changes status post lumbar laminectomy and fusion without evidence for hardware failure or decompensation. Diagnosis:      ICD-10-CM    1. Status post lumbar spinal arthrodesis  Z98.1           Assessment/Plan: This patient is following the expected course following the spine surgery. She can continue to gradually increase activities as tolerated.   I will have her return for follow up at 1 year anel post surgical .           Inessa Ram MD    01/05/23  8:42 AM

## 2023-01-13 ENCOUNTER — NURSE ONLY (OUTPATIENT)
Dept: FAMILY MEDICINE CLINIC | Facility: CLINIC | Age: 53
End: 2023-01-13

## 2023-01-13 DIAGNOSIS — E78.2 MIXED HYPERLIPIDEMIA: ICD-10-CM

## 2023-01-13 DIAGNOSIS — I10 ESSENTIAL HYPERTENSION: ICD-10-CM

## 2023-01-13 DIAGNOSIS — I10 ESSENTIAL HYPERTENSION: Primary | ICD-10-CM

## 2023-01-13 LAB
ALBUMIN SERPL-MCNC: 3.1 G/DL (ref 3.5–5)
ALBUMIN/GLOB SERPL: 0.9 (ref 0.4–1.6)
ALP SERPL-CCNC: 45 U/L (ref 50–136)
ALT SERPL-CCNC: 18 U/L (ref 12–65)
ANION GAP SERPL CALC-SCNC: 6 MMOL/L (ref 2–11)
AST SERPL-CCNC: 18 U/L (ref 15–37)
BASOPHILS # BLD: 0 K/UL (ref 0–0.2)
BASOPHILS NFR BLD: 1 % (ref 0–2)
BILIRUB SERPL-MCNC: 0.5 MG/DL (ref 0.2–1.1)
BUN SERPL-MCNC: 6 MG/DL (ref 6–23)
CALCIUM SERPL-MCNC: 10.3 MG/DL (ref 8.3–10.4)
CHLORIDE SERPL-SCNC: 110 MMOL/L (ref 101–110)
CHOLEST SERPL-MCNC: 216 MG/DL
CO2 SERPL-SCNC: 26 MMOL/L (ref 21–32)
CREAT SERPL-MCNC: 0.5 MG/DL (ref 0.6–1)
DIFFERENTIAL METHOD BLD: ABNORMAL
EOSINOPHIL # BLD: 0.1 K/UL (ref 0–0.8)
EOSINOPHIL NFR BLD: 3 % (ref 0.5–7.8)
ERYTHROCYTE [DISTWIDTH] IN BLOOD BY AUTOMATED COUNT: 15 % (ref 11.9–14.6)
GLOBULIN SER CALC-MCNC: 3.4 G/DL (ref 2.8–4.5)
GLUCOSE SERPL-MCNC: 89 MG/DL (ref 65–100)
HCT VFR BLD AUTO: 33.3 % (ref 35.8–46.3)
HDLC SERPL-MCNC: 53 MG/DL (ref 40–60)
HDLC SERPL: 4.1
HGB BLD-MCNC: 10.1 G/DL (ref 11.7–15.4)
IMM GRANULOCYTES # BLD AUTO: 0 K/UL (ref 0–0.5)
IMM GRANULOCYTES NFR BLD AUTO: 0 % (ref 0–5)
LDLC SERPL CALC-MCNC: 141 MG/DL
LYMPHOCYTES # BLD: 1.8 K/UL (ref 0.5–4.6)
LYMPHOCYTES NFR BLD: 38 % (ref 13–44)
MCH RBC QN AUTO: 24.8 PG (ref 26.1–32.9)
MCHC RBC AUTO-ENTMCNC: 30.3 G/DL (ref 31.4–35)
MCV RBC AUTO: 81.6 FL (ref 82–102)
MONOCYTES # BLD: 0.4 K/UL (ref 0.1–1.3)
MONOCYTES NFR BLD: 8 % (ref 4–12)
NEUTS SEG # BLD: 2.4 K/UL (ref 1.7–8.2)
NEUTS SEG NFR BLD: 50 % (ref 43–78)
NRBC # BLD: 0 K/UL (ref 0–0.2)
PLATELET # BLD AUTO: 322 K/UL (ref 150–450)
PMV BLD AUTO: 11.8 FL (ref 9.4–12.3)
POTASSIUM SERPL-SCNC: 4.1 MMOL/L (ref 3.5–5.1)
PROT SERPL-MCNC: 6.5 G/DL (ref 6.3–8.2)
RBC # BLD AUTO: 4.08 M/UL (ref 4.05–5.2)
SODIUM SERPL-SCNC: 142 MMOL/L (ref 133–143)
TRIGL SERPL-MCNC: 110 MG/DL (ref 35–150)
VLDLC SERPL CALC-MCNC: 22 MG/DL (ref 6–23)
WBC # BLD AUTO: 4.8 K/UL (ref 4.3–11.1)

## 2023-01-20 ENCOUNTER — OFFICE VISIT (OUTPATIENT)
Dept: FAMILY MEDICINE CLINIC | Facility: CLINIC | Age: 53
End: 2023-01-20
Payer: COMMERCIAL

## 2023-01-20 DIAGNOSIS — D50.9 MICROCYTIC ANEMIA: Primary | ICD-10-CM

## 2023-01-20 DIAGNOSIS — Z12.11 COLON CANCER SCREENING: ICD-10-CM

## 2023-01-20 DIAGNOSIS — Z98.84 HISTORY OF ROUX-EN-Y GASTRIC BYPASS: ICD-10-CM

## 2023-01-20 DIAGNOSIS — R13.19 ESOPHAGEAL DYSPHAGIA: ICD-10-CM

## 2023-01-20 DIAGNOSIS — I10 ESSENTIAL HYPERTENSION: ICD-10-CM

## 2023-01-20 LAB
FOLATE SERPL-MCNC: 7.5 NG/ML (ref 3.1–17.5)
IRON SATN MFR SERPL: 7 %
IRON SERPL-MCNC: 29 UG/DL (ref 35–150)
TIBC SERPL-MCNC: 400 UG/DL (ref 250–450)
VIT B12 SERPL-MCNC: 431 PG/ML (ref 193–986)

## 2023-01-20 PROCEDURE — 3078F DIAST BP <80 MM HG: CPT | Performed by: STUDENT IN AN ORGANIZED HEALTH CARE EDUCATION/TRAINING PROGRAM

## 2023-01-20 PROCEDURE — 99214 OFFICE O/P EST MOD 30 MIN: CPT | Performed by: STUDENT IN AN ORGANIZED HEALTH CARE EDUCATION/TRAINING PROGRAM

## 2023-01-20 PROCEDURE — 3074F SYST BP LT 130 MM HG: CPT | Performed by: STUDENT IN AN ORGANIZED HEALTH CARE EDUCATION/TRAINING PROGRAM

## 2023-01-20 ASSESSMENT — ENCOUNTER SYMPTOMS: BLOOD IN STOOL: 0

## 2023-01-20 NOTE — PROGRESS NOTES
[facility]  Pavel Solis  Phone [phone]  Fax:  [phone]    Ibis Hathaway (:  1970) is a 50 y.o. female here for evaluation of the following chief complaint(s):  Follow-up (Review labs /No refills)       ASSESSMENT/PLAN:  1. Microcytic anemia  -     Transferrin Saturation; Future  -     Folate; Future  -     Vitamin B12; Future  2. Esophageal dysphagia  -     [facility]  3. History of Steve-en-Y gastric bypass  -     [facility]  4. Essential hypertension  5. Colon cancer screening  -     [facility]    Recent lipid panel showed mildly elevated cholesterol. CBC showed microcytic anemia with hemoglobin of 10.1. Check B12, folate, and iron saturation. Patient reports esophageal dysphagia, also needs colon cancer screening. Will refer to GI for EGD and colonoscopy. Blood pressure well controlled off medications. Negative Pap smear with negative HPV 2022. Due for mammogram screening, advised patient to get this done. Return in about 1 year (around 2024) for routine f/u. Subjective   SUBJECTIVE/OBJECTIVE:  HPI  61-year-old female with PMH of HTN and chronic low back pain s/p lumbar fusion who presents for regular 6-month follow-up chronic medical problems.  -Esophageal dysphagia past few months  -Remote h/o gastric bypass surgery (Steve en y)  -Takes Prilosec BID  -Gave blood and had period prior to labs  -Stopped HCTZ a few months ago, has lost weight since her back surgery    Review of Systems   Constitutional:  Negative for diaphoresis and fever. Gastrointestinal:  Negative for blood in stool. Genitourinary:  Negative for hematuria. Objective     Vitals:    23 1000   BP: 102/78   Pulse: 71   Temp: 97 °F (36.1 °C)   SpO2: 99%       Physical Exam  Vitals reviewed. Constitutional:       General: She is not in acute distress.   HENT:      Head: Normocephalic and atraumatic. Cardiovascular:      Rate and Rhythm: Normal rate and regular rhythm. Pulmonary:      Effort: Pulmonary effort is normal.      Breath sounds: Normal breath sounds. Musculoskeletal:      Right lower leg: No edema. Left lower leg: No edema. Skin:     General: Skin is warm and dry. Neurological:      General: No focal deficit present. Mental Status: She is alert and oriented to person, place, and time. An electronic signature was used to authenticate this note.     --Shelly Aguilar MD

## 2023-01-31 VITALS
SYSTOLIC BLOOD PRESSURE: 102 MMHG | BODY MASS INDEX: 28.56 KG/M2 | OXYGEN SATURATION: 99 % | HEIGHT: 67 IN | DIASTOLIC BLOOD PRESSURE: 78 MMHG | TEMPERATURE: 97 F | HEART RATE: 71 BPM | WEIGHT: 182 LBS

## 2023-01-31 PROBLEM — Z98.84 HISTORY OF ROUX-EN-Y GASTRIC BYPASS: Status: ACTIVE | Noted: 2023-01-31

## 2023-02-01 RX ORDER — FERROUS SULFATE 325(65) MG
325 TABLET ORAL
Qty: 30 TABLET | Refills: 5 | Status: SHIPPED | OUTPATIENT
Start: 2023-02-01

## 2023-05-19 ENCOUNTER — HOSPITAL ENCOUNTER (OUTPATIENT)
Dept: MAMMOGRAPHY | Age: 53
Discharge: HOME | End: 2023-05-19
Payer: COMMERCIAL

## 2023-05-19 DIAGNOSIS — Z12.31 VISIT FOR SCREENING MAMMOGRAM: ICD-10-CM

## 2023-05-19 PROCEDURE — 77063 BREAST TOMOSYNTHESIS BI: CPT

## 2023-07-25 ENCOUNTER — COMMUNITY OUTREACH (OUTPATIENT)
Dept: FAMILY MEDICINE CLINIC | Facility: CLINIC | Age: 53
End: 2023-07-25

## 2023-07-31 ENCOUNTER — OFFICE VISIT (OUTPATIENT)
Dept: ORTHOPEDIC SURGERY | Age: 53
End: 2023-07-31
Payer: COMMERCIAL

## 2023-07-31 DIAGNOSIS — Z98.1 STATUS POST LUMBAR SPINAL ARTHRODESIS: Primary | ICD-10-CM

## 2023-07-31 PROCEDURE — 99213 OFFICE O/P EST LOW 20 MIN: CPT | Performed by: ORTHOPAEDIC SURGERY

## 2023-07-31 NOTE — PROGRESS NOTES
Name: Pato Sanabria  YOB: 1970  Gender: female  MRN: [id number]  Age: 50 y.o. Chief Complaint: Lumbar spine surgery follow up    History of Present Illness:      Pato Sanabria  is here for 1 year follow up of her  DLIF surgery. She reports significant relief of preoperative lower extremity pain, weakness and parasthesias. There is the expected residual back stiffness. Medications:       Current Outpatient Medications:     ferrous sulfate (IRON 325) 325 (65 Fe) MG tablet, Take 1 tablet by mouth daily (with breakfast), Disp: 30 tablet, Rfl: 5    omeprazole (PRILOSEC) 20 MG delayed release capsule, Take 1 capsule by mouth 2 times daily (before meals), Disp: 180 capsule, Rfl: 3    ibuprofen (ADVIL;MOTRIN) 200 MG tablet, Take 200 mg by mouth every 6 hours as needed for Pain (For temporary pain postop per Dr Wilfrid Lynch MA), Disp: , Rfl:     acetaminophen (TYLENOL) 500 MG tablet, Take 1,000 mg by mouth every 6 hours as needed for Pain, Disp: , Rfl:     Allergies:  No Known Allergies      Physical Exam:      Awake and oriented in no acute distress. Gait is upright and nonantalgic. No focal neurologic deficit. Radiographic Studies:     X-rays including AP and lateral views of the lumbar spine were reviewed and interpreted:     Postoperative changes are noted status post lumbar fusion with instrumentation. The hardware appears to be well-placed and intact. There is no evidence of significant osteolysis. No significant adjacent level decompensation. Alignment is maintained. Radiographic Impression:    Appropriate postoperative changes status post lumbar laminectomy and fusion without evidence for hardware failure or decompensation. Diagnosis:      ICD-10-CM    1. Status post lumbar spinal arthrodesis  Z98.1 XR LUMBAR SPINE (2-3 VIEWS)          Assessment/Plan: This patient is following the expected course following the spine surgery.      She can continue

## 2024-01-18 SDOH — ECONOMIC STABILITY: HOUSING INSECURITY
IN THE LAST 12 MONTHS, WAS THERE A TIME WHEN YOU DID NOT HAVE A STEADY PLACE TO SLEEP OR SLEPT IN A SHELTER (INCLUDING NOW)?: NO

## 2024-01-18 SDOH — ECONOMIC STABILITY: FOOD INSECURITY: WITHIN THE PAST 12 MONTHS, THE FOOD YOU BOUGHT JUST DIDN'T LAST AND YOU DIDN'T HAVE MONEY TO GET MORE.: NEVER TRUE

## 2024-01-18 SDOH — ECONOMIC STABILITY: INCOME INSECURITY: HOW HARD IS IT FOR YOU TO PAY FOR THE VERY BASICS LIKE FOOD, HOUSING, MEDICAL CARE, AND HEATING?: NOT HARD AT ALL

## 2024-01-18 SDOH — ECONOMIC STABILITY: FOOD INSECURITY: WITHIN THE PAST 12 MONTHS, YOU WORRIED THAT YOUR FOOD WOULD RUN OUT BEFORE YOU GOT MONEY TO BUY MORE.: NEVER TRUE

## 2024-01-18 SDOH — ECONOMIC STABILITY: TRANSPORTATION INSECURITY
IN THE PAST 12 MONTHS, HAS LACK OF TRANSPORTATION KEPT YOU FROM MEETINGS, WORK, OR FROM GETTING THINGS NEEDED FOR DAILY LIVING?: NO

## 2024-01-18 ASSESSMENT — PATIENT HEALTH QUESTIONNAIRE - PHQ9
1. LITTLE INTEREST OR PLEASURE IN DOING THINGS: 0
SUM OF ALL RESPONSES TO PHQ9 QUESTIONS 1 & 2: 1
SUM OF ALL RESPONSES TO PHQ QUESTIONS 1-9: 1
2. FEELING DOWN, DEPRESSED OR HOPELESS: SEVERAL DAYS
SUM OF ALL RESPONSES TO PHQ QUESTIONS 1-9: 1
SUM OF ALL RESPONSES TO PHQ9 QUESTIONS 1 & 2: 1
SUM OF ALL RESPONSES TO PHQ QUESTIONS 1-9: 1
1. LITTLE INTEREST OR PLEASURE IN DOING THINGS: NOT AT ALL
2. FEELING DOWN, DEPRESSED OR HOPELESS: 1
SUM OF ALL RESPONSES TO PHQ QUESTIONS 1-9: 1

## 2024-01-19 ENCOUNTER — OFFICE VISIT (OUTPATIENT)
Dept: FAMILY MEDICINE CLINIC | Facility: CLINIC | Age: 54
End: 2024-01-19
Payer: COMMERCIAL

## 2024-01-19 VITALS
HEIGHT: 67 IN | WEIGHT: 211 LBS | BODY MASS INDEX: 33.12 KG/M2 | DIASTOLIC BLOOD PRESSURE: 68 MMHG | OXYGEN SATURATION: 94 % | TEMPERATURE: 97 F | SYSTOLIC BLOOD PRESSURE: 122 MMHG | HEART RATE: 100 BPM

## 2024-01-19 DIAGNOSIS — F41.9 ANXIETY: ICD-10-CM

## 2024-01-19 DIAGNOSIS — Z98.84 HISTORY OF ROUX-EN-Y GASTRIC BYPASS: ICD-10-CM

## 2024-01-19 DIAGNOSIS — E78.2 MIXED HYPERLIPIDEMIA: ICD-10-CM

## 2024-01-19 DIAGNOSIS — E50.9 VITAMIN A DEFICIENCY: ICD-10-CM

## 2024-01-19 DIAGNOSIS — E78.2 MIXED HYPERLIPIDEMIA: Primary | ICD-10-CM

## 2024-01-19 DIAGNOSIS — D50.9 IRON DEFICIENCY ANEMIA, UNSPECIFIED IRON DEFICIENCY ANEMIA TYPE: ICD-10-CM

## 2024-01-19 LAB
25(OH)D3 SERPL-MCNC: 24.9 NG/ML (ref 30–100)
ALBUMIN SERPL-MCNC: 3.8 G/DL (ref 3.5–5)
ALBUMIN/GLOB SERPL: 1.2 (ref 0.4–1.6)
ALP SERPL-CCNC: 65 U/L (ref 50–136)
ALT SERPL-CCNC: 27 U/L (ref 12–65)
ANION GAP SERPL CALC-SCNC: 5 MMOL/L (ref 2–11)
AST SERPL-CCNC: 26 U/L (ref 15–37)
BASOPHILS # BLD: 0.1 K/UL (ref 0–0.2)
BASOPHILS NFR BLD: 1 % (ref 0–2)
BILIRUB SERPL-MCNC: 1.1 MG/DL (ref 0.2–1.1)
BUN SERPL-MCNC: 15 MG/DL (ref 6–23)
CALCIUM SERPL-MCNC: 9.7 MG/DL (ref 8.3–10.4)
CHLORIDE SERPL-SCNC: 108 MMOL/L (ref 103–113)
CHOLEST SERPL-MCNC: 230 MG/DL
CO2 SERPL-SCNC: 31 MMOL/L (ref 21–32)
CREAT SERPL-MCNC: 0.6 MG/DL (ref 0.6–1)
DIFFERENTIAL METHOD BLD: NORMAL
EOSINOPHIL # BLD: 0.1 K/UL (ref 0–0.8)
EOSINOPHIL NFR BLD: 3 % (ref 0.5–7.8)
ERYTHROCYTE [DISTWIDTH] IN BLOOD BY AUTOMATED COUNT: 12.6 % (ref 11.9–14.6)
GLOBULIN SER CALC-MCNC: 3.3 G/DL (ref 2.8–4.5)
GLUCOSE SERPL-MCNC: 96 MG/DL (ref 65–100)
HCT VFR BLD AUTO: 43.8 % (ref 35.8–46.3)
HDLC SERPL-MCNC: 73 MG/DL (ref 40–60)
HDLC SERPL: 3.2
HGB BLD-MCNC: 14.2 G/DL (ref 11.7–15.4)
IMM GRANULOCYTES # BLD AUTO: 0 K/UL (ref 0–0.5)
IMM GRANULOCYTES NFR BLD AUTO: 0 % (ref 0–5)
IRON SATN MFR SERPL: 39 %
IRON SERPL-MCNC: 156 UG/DL (ref 35–150)
LDLC SERPL CALC-MCNC: 144.8 MG/DL
LYMPHOCYTES # BLD: 1.6 K/UL (ref 0.5–4.6)
LYMPHOCYTES NFR BLD: 30 % (ref 13–44)
MAGNESIUM SERPL-MCNC: 2.3 MG/DL (ref 1.8–2.4)
MCH RBC QN AUTO: 30 PG (ref 26.1–32.9)
MCHC RBC AUTO-ENTMCNC: 32.4 G/DL (ref 31.4–35)
MCV RBC AUTO: 92.6 FL (ref 82–102)
MONOCYTES # BLD: 0.4 K/UL (ref 0.1–1.3)
MONOCYTES NFR BLD: 8 % (ref 4–12)
NEUTS SEG # BLD: 3.2 K/UL (ref 1.7–8.2)
NEUTS SEG NFR BLD: 58 % (ref 43–78)
NRBC # BLD: 0 K/UL (ref 0–0.2)
PLATELET # BLD AUTO: 282 K/UL (ref 150–450)
PMV BLD AUTO: 11.7 FL (ref 9.4–12.3)
POTASSIUM SERPL-SCNC: 4.2 MMOL/L (ref 3.5–5.1)
PROT SERPL-MCNC: 7.1 G/DL (ref 6.3–8.2)
RBC # BLD AUTO: 4.73 M/UL (ref 4.05–5.2)
SODIUM SERPL-SCNC: 144 MMOL/L (ref 136–146)
TIBC SERPL-MCNC: 405 UG/DL (ref 250–450)
TRIGL SERPL-MCNC: 61 MG/DL (ref 35–150)
TSH W FREE THYROID IF ABNORMAL: 2.04 UIU/ML (ref 0.36–3.74)
VLDLC SERPL CALC-MCNC: 12.2 MG/DL (ref 6–23)
WBC # BLD AUTO: 5.4 K/UL (ref 4.3–11.1)

## 2024-01-19 PROCEDURE — 3074F SYST BP LT 130 MM HG: CPT | Performed by: STUDENT IN AN ORGANIZED HEALTH CARE EDUCATION/TRAINING PROGRAM

## 2024-01-19 PROCEDURE — 99214 OFFICE O/P EST MOD 30 MIN: CPT | Performed by: STUDENT IN AN ORGANIZED HEALTH CARE EDUCATION/TRAINING PROGRAM

## 2024-01-19 PROCEDURE — 3078F DIAST BP <80 MM HG: CPT | Performed by: STUDENT IN AN ORGANIZED HEALTH CARE EDUCATION/TRAINING PROGRAM

## 2024-01-19 RX ORDER — HYDROCORTISONE ACETATE 25 MG/1
25 SUPPOSITORY RECTAL 2 TIMES DAILY PRN
Qty: 24 SUPPOSITORY | Refills: 0 | Status: SHIPPED | OUTPATIENT
Start: 2024-01-19

## 2024-01-19 RX ORDER — CITALOPRAM 20 MG/1
20 TABLET ORAL DAILY
Qty: 90 TABLET | Refills: 1 | Status: SHIPPED | OUTPATIENT
Start: 2024-01-19

## 2024-01-19 RX ORDER — OMEPRAZOLE 20 MG/1
20 CAPSULE, DELAYED RELEASE ORAL
Qty: 180 CAPSULE | Refills: 3 | Status: SHIPPED | OUTPATIENT
Start: 2024-01-19

## 2024-01-19 NOTE — PROGRESS NOTES
[facility]  [address] [place] [address]  Phone [phone]  Fax:  [phone]    Jaci Adams (:  1970) is a 53 y.o. female here for evaluation of the following chief complaint(s):  Gastroesophageal Reflux (No labs/refill)       ASSESSMENT/PLAN:  1. Mixed hyperlipidemia  -     Comprehensive Metabolic Panel; Future  -     Lipid Panel; Future  2. Iron deficiency anemia, unspecified iron deficiency anemia type  -     CBC with Auto Differential; Future  -     Transferrin Saturation; Future  3. Vitamin A deficiency  -     Vitamin A; Future  4. History of Steve-en-Y gastric bypass  -     Magnesium; Future  -     Vitamin D 25 Hydroxy; Future  5. Anxiety  -     TSH with Reflex; Future    Start Celexa for anxiety.    Continue Prilosec for GERD and esophageal stricture.    History of iron deficiency anemia, takes iron daily.  Reports no periods over the past 6 months.  Check CBC and iron saturation.    Try Anusol suppositories for suspected hemorrhoid.  Return precautions discussed.    Negative mammogram 2023.  Colonoscopy 2023 showed tubular adenomas, 5-year repeat recommended.  EGD 2023 showed normal esophageal mucosa with stricture in the anastomosis s/p dilation.  Negative Pap smear with negative HPV 2022.    Return in about 1 year (around 2025) for routine f/u.         Subjective   SUBJECTIVE/OBJECTIVE:  HPI  53-year-old female with PMH of HTN, gastric bypass, and chronic low back pain s/p lumbar fusion who presents for regular annual follow-up chronic medical problems.   -Reports issues with anxiety about her kids and work  -Mood is fine  -Reports she has hemorrhoids which occasionally bleed  -No melena    Review of Systems   Respiratory:  Negative for shortness of breath.    Cardiovascular:  Negative for chest pain.          Objective     Vitals:    24 0857   BP: 122/68   Pulse: 100   Temp: 97 °F (36.1 °C)   SpO2: 94%       Physical Exam  Vitals reviewed.

## 2024-01-22 LAB — VIT A SERPL-MCNC: 25.6 UG/DL (ref 20.1–62)

## 2024-08-13 RX ORDER — OMEPRAZOLE 20 MG/1
20 CAPSULE, DELAYED RELEASE ORAL
Qty: 180 CAPSULE | Refills: 3 | Status: SHIPPED | OUTPATIENT
Start: 2024-08-13

## 2024-08-13 RX ORDER — CITALOPRAM 20 MG/1
20 TABLET ORAL DAILY
Qty: 90 TABLET | Refills: 1 | Status: SHIPPED | OUTPATIENT
Start: 2024-08-13

## 2024-08-20 RX ORDER — CITALOPRAM HYDROBROMIDE 20 MG/1
20 TABLET ORAL DAILY
Qty: 90 TABLET | Refills: 1 | OUTPATIENT
Start: 2024-08-20

## 2024-11-27 RX ORDER — CITALOPRAM HYDROBROMIDE 20 MG/1
20 TABLET ORAL DAILY
Qty: 90 TABLET | Refills: 1 | Status: SHIPPED | OUTPATIENT
Start: 2024-11-27

## 2025-01-22 SDOH — ECONOMIC STABILITY: FOOD INSECURITY: WITHIN THE PAST 12 MONTHS, THE FOOD YOU BOUGHT JUST DIDN'T LAST AND YOU DIDN'T HAVE MONEY TO GET MORE.: NEVER TRUE

## 2025-01-22 SDOH — ECONOMIC STABILITY: FOOD INSECURITY: WITHIN THE PAST 12 MONTHS, YOU WORRIED THAT YOUR FOOD WOULD RUN OUT BEFORE YOU GOT MONEY TO BUY MORE.: NEVER TRUE

## 2025-01-22 SDOH — ECONOMIC STABILITY: INCOME INSECURITY: IN THE LAST 12 MONTHS, WAS THERE A TIME WHEN YOU WERE NOT ABLE TO PAY THE MORTGAGE OR RENT ON TIME?: NO

## 2025-01-22 SDOH — ECONOMIC STABILITY: TRANSPORTATION INSECURITY
IN THE PAST 12 MONTHS, HAS THE LACK OF TRANSPORTATION KEPT YOU FROM MEDICAL APPOINTMENTS OR FROM GETTING MEDICATIONS?: NO

## 2025-01-22 ASSESSMENT — PATIENT HEALTH QUESTIONNAIRE - PHQ9
SUM OF ALL RESPONSES TO PHQ QUESTIONS 1-9: 0
SUM OF ALL RESPONSES TO PHQ QUESTIONS 1-9: 0
2. FEELING DOWN, DEPRESSED OR HOPELESS: NOT AT ALL
SUM OF ALL RESPONSES TO PHQ QUESTIONS 1-9: 0
2. FEELING DOWN, DEPRESSED OR HOPELESS: NOT AT ALL
SUM OF ALL RESPONSES TO PHQ9 QUESTIONS 1 & 2: 0
1. LITTLE INTEREST OR PLEASURE IN DOING THINGS: NOT AT ALL
SUM OF ALL RESPONSES TO PHQ QUESTIONS 1-9: 0
1. LITTLE INTEREST OR PLEASURE IN DOING THINGS: NOT AT ALL
SUM OF ALL RESPONSES TO PHQ9 QUESTIONS 1 & 2: 0

## 2025-01-24 ENCOUNTER — OFFICE VISIT (OUTPATIENT)
Dept: FAMILY MEDICINE CLINIC | Facility: CLINIC | Age: 55
End: 2025-01-24
Payer: COMMERCIAL

## 2025-01-24 VITALS
DIASTOLIC BLOOD PRESSURE: 82 MMHG | OXYGEN SATURATION: 98 % | HEIGHT: 67 IN | BODY MASS INDEX: 32.49 KG/M2 | TEMPERATURE: 97 F | WEIGHT: 207 LBS | SYSTOLIC BLOOD PRESSURE: 110 MMHG | HEART RATE: 98 BPM

## 2025-01-24 DIAGNOSIS — K21.9 GASTROESOPHAGEAL REFLUX DISEASE, UNSPECIFIED WHETHER ESOPHAGITIS PRESENT: ICD-10-CM

## 2025-01-24 DIAGNOSIS — Z98.84 HISTORY OF ROUX-EN-Y GASTRIC BYPASS: ICD-10-CM

## 2025-01-24 DIAGNOSIS — Z86.39 HISTORY OF IRON DEFICIENCY: ICD-10-CM

## 2025-01-24 DIAGNOSIS — F41.9 ANXIETY: ICD-10-CM

## 2025-01-24 DIAGNOSIS — Z12.31 ENCOUNTER FOR SCREENING MAMMOGRAM FOR MALIGNANT NEOPLASM OF BREAST: ICD-10-CM

## 2025-01-24 DIAGNOSIS — F41.9 ANXIETY: Primary | ICD-10-CM

## 2025-01-24 DIAGNOSIS — E78.2 MIXED HYPERLIPIDEMIA: ICD-10-CM

## 2025-01-24 DIAGNOSIS — E55.9 VITAMIN D DEFICIENCY: ICD-10-CM

## 2025-01-24 LAB
25(OH)D3 SERPL-MCNC: 18.2 NG/ML (ref 30–100)
ALBUMIN SERPL-MCNC: 3.5 G/DL (ref 3.5–5)
ALBUMIN/GLOB SERPL: 0.9 (ref 1–1.9)
ALP SERPL-CCNC: 69 U/L (ref 35–104)
ALT SERPL-CCNC: 21 U/L (ref 8–45)
ANION GAP SERPL CALC-SCNC: 9 MMOL/L (ref 7–16)
AST SERPL-CCNC: 27 U/L (ref 15–37)
BASOPHILS # BLD: 0.07 K/UL (ref 0–0.2)
BASOPHILS NFR BLD: 1 % (ref 0–2)
BILIRUB SERPL-MCNC: 0.7 MG/DL (ref 0–1.2)
BUN SERPL-MCNC: 9 MG/DL (ref 6–23)
CALCIUM SERPL-MCNC: 9.5 MG/DL (ref 8.8–10.2)
CHLORIDE SERPL-SCNC: 102 MMOL/L (ref 98–107)
CHOLEST SERPL-MCNC: 216 MG/DL (ref 0–200)
CO2 SERPL-SCNC: 30 MMOL/L (ref 20–29)
CREAT SERPL-MCNC: 0.64 MG/DL (ref 0.6–1.1)
DIFFERENTIAL METHOD BLD: ABNORMAL
EOSINOPHIL # BLD: 0.25 K/UL (ref 0–0.8)
EOSINOPHIL NFR BLD: 3.7 % (ref 0.5–7.8)
ERYTHROCYTE [DISTWIDTH] IN BLOOD BY AUTOMATED COUNT: 16.4 % (ref 11.9–14.6)
GLOBULIN SER CALC-MCNC: 3.7 G/DL (ref 2.3–3.5)
GLUCOSE SERPL-MCNC: 102 MG/DL (ref 70–99)
HCT VFR BLD AUTO: 41.9 % (ref 35.8–46.3)
HDLC SERPL-MCNC: 81 MG/DL (ref 40–60)
HDLC SERPL: 2.7 (ref 0–5)
HGB BLD-MCNC: 13.4 G/DL (ref 11.7–15.4)
IMM GRANULOCYTES # BLD AUTO: 0.01 K/UL (ref 0–0.5)
IMM GRANULOCYTES NFR BLD AUTO: 0.1 % (ref 0–5)
IRON SATN MFR SERPL: 16 % (ref 20–50)
IRON SERPL-MCNC: 67 UG/DL (ref 35–100)
LDLC SERPL CALC-MCNC: 120 MG/DL (ref 0–100)
LYMPHOCYTES # BLD: 1.84 K/UL (ref 0.5–4.6)
LYMPHOCYTES NFR BLD: 27 % (ref 13–44)
MCH RBC QN AUTO: 27.6 PG (ref 26.1–32.9)
MCHC RBC AUTO-ENTMCNC: 32 G/DL (ref 31.4–35)
MCV RBC AUTO: 86.2 FL (ref 82–102)
MONOCYTES # BLD: 0.57 K/UL (ref 0.1–1.3)
MONOCYTES NFR BLD: 8.4 % (ref 4–12)
NEUTS SEG # BLD: 4.07 K/UL (ref 1.7–8.2)
NEUTS SEG NFR BLD: 59.8 % (ref 43–78)
NRBC # BLD: 0 K/UL (ref 0–0.2)
PLATELET # BLD AUTO: 265 K/UL (ref 150–450)
PMV BLD AUTO: 11.4 FL (ref 9.4–12.3)
POTASSIUM SERPL-SCNC: 4.6 MMOL/L (ref 3.5–5.1)
PROT SERPL-MCNC: 7.2 G/DL (ref 6.3–8.2)
RBC # BLD AUTO: 4.86 M/UL (ref 4.05–5.2)
SODIUM SERPL-SCNC: 141 MMOL/L (ref 136–145)
TIBC SERPL-MCNC: 430 UG/DL (ref 240–450)
TRIGL SERPL-MCNC: 79 MG/DL (ref 0–150)
UIBC SERPL-MCNC: 363 UG/DL (ref 112–347)
VLDLC SERPL CALC-MCNC: 16 MG/DL (ref 6–23)
WBC # BLD AUTO: 6.8 K/UL (ref 4.3–11.1)

## 2025-01-24 PROCEDURE — 3079F DIAST BP 80-89 MM HG: CPT | Performed by: STUDENT IN AN ORGANIZED HEALTH CARE EDUCATION/TRAINING PROGRAM

## 2025-01-24 PROCEDURE — 3074F SYST BP LT 130 MM HG: CPT | Performed by: STUDENT IN AN ORGANIZED HEALTH CARE EDUCATION/TRAINING PROGRAM

## 2025-01-24 PROCEDURE — 99214 OFFICE O/P EST MOD 30 MIN: CPT | Performed by: STUDENT IN AN ORGANIZED HEALTH CARE EDUCATION/TRAINING PROGRAM

## 2025-01-24 RX ORDER — FERROUS SULFATE 325(65) MG
325 TABLET ORAL
Qty: 90 TABLET | Refills: 3 | Status: CANCELLED | OUTPATIENT
Start: 2025-01-24

## 2025-01-24 RX ORDER — CITALOPRAM HYDROBROMIDE 20 MG/1
20 TABLET ORAL DAILY
Qty: 90 TABLET | Refills: 3 | Status: SHIPPED | OUTPATIENT
Start: 2025-01-24

## 2025-01-24 NOTE — PROGRESS NOTES
[facility]  [address] [place] [address]  Phone [phone]  Fax:  [phone]    Jaci Adams (:  1970) is a 54 y.o. female here for evaluation of the following chief complaint(s):  Gastroesophageal Reflux (No labs /refills)    Assessment & Plan   ASSESSMENT/PLAN:  1. Anxiety  -     Comprehensive Metabolic Panel; Future  -     CBC with Auto Differential; Future  2. Mixed hyperlipidemia  -     Lipid Panel; Future  3. History of Steve-en-Y gastric bypass  -     Comprehensive Metabolic Panel; Future  -     CBC with Auto Differential; Future  4. Gastroesophageal reflux disease, unspecified whether esophagitis present  5. History of iron deficiency  -     Iron and TIBC; Future  6. Encounter for screening mammogram for malignant neoplasm of breast  -     NIKKY DIGITAL SCREEN W OR WO CAD BILATERAL; Future  7. Vitamin D deficiency  -     Vitamin D 25 Hydroxy; Future    Continue Celexa for anxiety.     Continue Prilosec for GERD and esophageal stricture.     History of iron deficiency anemia, takes iron sometimes.  Check CBC and iron saturation.    History of HLD, check lipid panel.     Negative mammogram 2023.  Colonoscopy 2023 showed tubular adenomas, 5-year repeat recommended.  EGD 2023 showed normal esophageal mucosa with stricture in the anastomosis s/p dilation.  Negative Pap smear with negative HPV 2022.     Return in about 1 year (around 2026) for routine f/u.         Subjective   SUBJECTIVE/OBJECTIVE:  HPI  54-year-old female with PMH of HTN, gastric bypass, GERD, anxiety, and chronic low back pain s/p lumbar fusion who presents for regular annual follow-up chronic medical problems.   -Celexa has helped with anxiety      Review of Systems       Objective     Vitals:    25 0902   BP: 110/82   Pulse: 98   Temp: 97 °F (36.1 °C)   SpO2: 98%       Physical Exam  Vitals reviewed.   Constitutional:       General: She is not in acute distress.     Appearance: She

## 2025-04-09 RX ORDER — OMEPRAZOLE 20 MG/1
20 CAPSULE, DELAYED RELEASE ORAL
Qty: 180 CAPSULE | Refills: 3 | Status: SHIPPED | OUTPATIENT
Start: 2025-04-09

## (undated) DEVICE — SHARP GUIDE WIRE: Brand: ARIA

## (undated) DEVICE — SUTURE VCRL SZ 1 L27IN ABSRB UD L36MM CP-1 1/2 CIR REV CUT J268H

## (undated) DEVICE — TUBING, SUCTION, 1/4" X 10', STRAIGHT: Brand: MEDLINE

## (undated) DEVICE — PROBE DILATOR 2: Brand: AVS ARIA

## (undated) DEVICE — PROBE DILATOR 3: Brand: AVS ARIA

## (undated) DEVICE — SUTURE VCRL SZ 2-0 L27IN ABSRB UD L36MM CP-1 1/2 CIR REV J266H

## (undated) DEVICE — KIT ARMOR C DRP COLLAPSIBLE AND SELF EXP TOP CVR FOR FLUOROSCOPIC

## (undated) DEVICE — SOLUTION IRRIG 1000ML 09% SOD CHL USP PIC PLAS CONTAINER

## (undated) DEVICE — SUTURE VCRL + SZ 3-0 L18IN ABSRB UD PS-2 3/8 CIR REV CUT VCP497H

## (undated) DEVICE — TRAY,URINE METER,100% SILICONE,16FR10ML: Brand: MEDLINE

## (undated) DEVICE — NEUROMONITORING PROBE: Brand: AVS ARIA

## (undated) DEVICE — ABSORBENT, WATERPROOF, BACTERIA PROOF FILM DRESSING: Brand: OPSITE POST OP 9.5X8.5CM CTN 20

## (undated) DEVICE — DRILL BIT: Brand: XIA 4.5 SYSTEM -  XIA CT

## (undated) DEVICE — MODULAR TAP: Brand: XIA 4.5 SYSTEM -  XIA CT

## (undated) DEVICE — 3M™ STERI-DRAPE™ INSTRUMENT POUCH 1018: Brand: STERI-DRAPE™

## (undated) DEVICE — DISSECTOR LAP DIA5MM STD W/ COT SWAB DISP FOR SWABBING FLD

## (undated) DEVICE — BIPOLAR SEALER 23-112-1 AQM 6.0: Brand: AQUAMANTYS ®

## (undated) DEVICE — ABSORBENT, WATERPROOF, BACTERIA PROOF FILM DRESSING: Brand: OPSITE POST OP 20X10CM CTN 20

## (undated) DEVICE — PENCIL ES L3M BTTN SWCH HOLSTER W/ BLDE ELECTRD EDGE

## (undated) DEVICE — UNIVERSAL DRAPES: Brand: MEDLINE INDUSTRIES, INC.

## (undated) DEVICE — POSTERIOR LAMI VANPLT-LUCAS: Brand: MEDLINE INDUSTRIES, INC.